# Patient Record
Sex: FEMALE | Race: WHITE | NOT HISPANIC OR LATINO | Employment: OTHER | URBAN - METROPOLITAN AREA
[De-identification: names, ages, dates, MRNs, and addresses within clinical notes are randomized per-mention and may not be internally consistent; named-entity substitution may affect disease eponyms.]

---

## 2018-01-25 ENCOUNTER — OFFICE VISIT (OUTPATIENT)
Dept: MEDICAL GROUP | Facility: PHYSICIAN GROUP | Age: 63
End: 2018-01-25
Payer: COMMERCIAL

## 2018-01-25 VITALS
OXYGEN SATURATION: 94 % | HEART RATE: 71 BPM | HEIGHT: 70 IN | WEIGHT: 220 LBS | BODY MASS INDEX: 31.5 KG/M2 | SYSTOLIC BLOOD PRESSURE: 138 MMHG | RESPIRATION RATE: 16 BRPM | TEMPERATURE: 98.4 F | DIASTOLIC BLOOD PRESSURE: 80 MMHG

## 2018-01-25 DIAGNOSIS — H40.9 GLAUCOMA, UNSPECIFIED GLAUCOMA TYPE, UNSPECIFIED LATERALITY: ICD-10-CM

## 2018-01-25 DIAGNOSIS — Z12.39 BREAST CANCER SCREENING: ICD-10-CM

## 2018-01-25 DIAGNOSIS — Z76.89 ENCOUNTER TO ESTABLISH CARE WITH NEW DOCTOR: ICD-10-CM

## 2018-01-25 DIAGNOSIS — Z23 NEED FOR VACCINATION: ICD-10-CM

## 2018-01-25 DIAGNOSIS — Z00.00 WELLNESS EXAMINATION: ICD-10-CM

## 2018-01-25 PROCEDURE — 99203 OFFICE O/P NEW LOW 30 MIN: CPT | Mod: 25 | Performed by: NURSE PRACTITIONER

## 2018-01-25 PROCEDURE — 90715 TDAP VACCINE 7 YRS/> IM: CPT | Performed by: NURSE PRACTITIONER

## 2018-01-25 PROCEDURE — 90472 IMMUNIZATION ADMIN EACH ADD: CPT | Performed by: NURSE PRACTITIONER

## 2018-01-25 PROCEDURE — 90471 IMMUNIZATION ADMIN: CPT | Performed by: NURSE PRACTITIONER

## 2018-01-25 PROCEDURE — 90686 IIV4 VACC NO PRSV 0.5 ML IM: CPT | Performed by: NURSE PRACTITIONER

## 2018-01-25 RX ORDER — LATANOPROST 50 UG/ML
1 SOLUTION/ DROPS OPHTHALMIC NIGHTLY
COMMUNITY

## 2018-01-25 ASSESSMENT — PATIENT HEALTH QUESTIONNAIRE - PHQ9: CLINICAL INTERPRETATION OF PHQ2 SCORE: 0

## 2018-01-25 NOTE — LETTER
[employer] [facility]  TAMIKA Jarquin  [address]  [place] [address]  Fax: [phone]   Authorization for Release/Disclosure of   Protected Health Information   Name: MCKENZIE CASTORENA : 1955 SSN: [id number]   Address:  [address]  [place] [address] Phone:    [phone] (home)    I authorize the entity listed below to release/disclose the PHI below to:   [facility]/TAMIKA Jarquin and TAMIKA Jarquin   Provider or Entity Name:                                             DR. OCONNOR   Address   City, [facility], [facility]   Phone:      Fax:     Reason for request: continuity of care   Information to be released:    [  ] LAST COLONOSCOPY,  including any PATH REPORT and follow-up  [  ] LAST FIT/COLOGUARD RESULT [  ] LAST DEXA  [  ] LAST MAMMOGRAM  [  ] LAST PAP  [  ] LAST LABS [  ] RETINA EXAM REPORT  [  ] IMMUNIZATION RECORDS  [  ] Release all info      [  ] Check here and initial the line next to each item to release ALL health information INCLUDING  _____ Care and treatment for drug and / or alcohol abuse  _____ HIV testing, infection status, or AIDS  _____ Genetic Testing    DATES OF SERVICE OR TIME PERIOD TO BE DISCLOSED: _____________  I understand and acknowledge that:  * This Authorization may be revoked at any time by you in writing, except if your health information has already been used or disclosed.  * Your health information that will be used or disclosed as a result of you signing this authorization could be re-disclosed by the recipient. If this occurs, your re-disclosed health information may no longer be protected by State or Federal laws.  * You may refuse to sign this Authorization. Your refusal will not affect your ability to obtain treatment.  * This Authorization becomes effective upon signing and will  on (date) __________.      If no date is indicated, this Authorization will  one (1) year from the signature date.    Name: Mckenzie  Coretta    Signature:   Date:     1/25/2018       PLEASE FAX REQUESTED RECORDS BACK TO: [phone]

## 2018-01-25 NOTE — ASSESSMENT & PLAN NOTE
Is here to establish with a new primary care provider.  Was previously seen in [place], [place].

## 2018-01-25 NOTE — PROGRESS NOTES
No chief complaint on file.      HISTORY OF PRESENT ILLNESS: Patient is a 62 y.o. female new patient who presents today to discuss the following issues:    Encounter to establish care with new doctor  Is here to establish with a new primary care provider.  Was previously seen in [place], [place].      BMI 31.0-31.9,adult  Patient is aware of BMI elevation.  Brief discussion of diet, exercise, and lifestyle modification.      Breast cancer screening  Due for screening mammogram.    Need for vaccination  Would like a flu shot and a Tdap shot today.      Glaucoma  Needs a referral to ophthalmology.  Has been stable on eyedrops for years.      Patient Active Problem List    Diagnosis Date Noted   • Encounter to establish care with new doctor 01/25/2018   • BMI 31.0-31.9,adult 01/25/2018   • Need for vaccination 01/25/2018   • Glaucoma 01/25/2018   • Breast cancer screening 01/25/2018       Allergies:Patient has no known allergies.    Current Outpatient Prescriptions   Medication Sig Dispense Refill   • latanoprost (XALATAN) 0.005 % Solution Place 1 Drop in both eyes every evening.       No current facility-administered medications for this visit.        Social History   Substance Use Topics   • Smoking status: Never Smoker   • Smokeless tobacco: Never Used   • Alcohol use No       No family status information on file.     Family History   Problem Relation Age of Onset   • Heart Attack Mother 76   • Kidney Disease Mother    • Lung Cancer Father 56       Review of Systems:   Constitutional: Negative for fever, chills, weight loss and malaise/fatigue.   HENT: Negative for ear pain, nosebleeds, congestion, sore throat and neck pain.    Eyes: Negative for blurred vision. Positive for stable glaucoma.  Respiratory: Negative for cough, sputum production, shortness of breath and wheezing.    Cardiovascular: Negative for chest pain, palpitations, orthopnea and leg swelling.   Gastrointestinal: Negative for heartburn, nausea,  "vomiting and abdominal pain.   Genitourinary: Negative for dysuria, urgency and frequency.   Musculoskeletal: Negative for myalgias, joint pain, and back pain.  Skin: Negative for rash and itching.   Neurological: Negative for dizziness, tingling, tremors, sensory change, focal weakness and headaches.   Endo/Heme/Allergies: Does not bruise/bleed easily.   Psychiatric/Behavioral: Negative for depression, suicidal ideas and memory loss.  The patient is not nervous/anxious and does not have insomnia.    All other systems reviewed and are negative except as in HPI.    Exam:  Blood pressure 138/80, pulse 71, temperature 36.9 °C (98.4 °F), resp. rate 16, height 1.778 m (5' 10\"), weight 99.8 kg (220 lb), SpO2 94 %.  General:  Well nourished, well developed female in NAD  Head: Grossly normal.  Neck: Supple without JVD or bruit. Thyroid is not enlarged.  Pulmonary: Clear to ausculation. Normal effort. No rales, ronchi, or wheezing.  Cardiovascular: Regular rate and rhythm without murmur.   Abdomen:  Bowel sounds + x 4. Soft, non-tender, nondistended.  Extremities: No clubbing, cyanosis, or edema.  Skin: Intact with no obvious rashes or lesions.  Neuro: Grossly intact.  Psych: Alert and oriented x 3.  Mood and affect appropriate.    Medical decision-making and discussion: Mckenzie is here to establish with a new primary care provider.  We reviewed her past medical history and discussed her current medications.  Lab work and mammogram were ordered, a referral was sent to opthalmology, and she was given flu and Tdap shots. She will sign a records release for her previous provider, she will sign up with [facility], and she will plan to follow-up here as needed.     I have placed the below orders and discussed them with an approved delegating provider. The MA is performing the below orders under the direction of Dr. Stephen, who have provided verbal consent for supervision.          Assessment/Plan:  1. Encounter to establish care with " new doctor     2. BMI 31.0-31.9,adult  Patient identified as having weight management issue.  Appropriate orders and counseling given.   3. Wellness examination  CBC WITH DIFFERENTIAL    COMP METABOLIC PANEL    LIPID PROFILE    TSH    VITAMIN D,25 HYDROXY   4. Breast cancer screening  MA-SCREEN MAMMO W/CAD-BILAT   5. Need for vaccination  Flu Quad Inj >3 Year Pre-Filled PF    Tdap =>8yo IM   6. Glaucoma, unspecified glaucoma type, unspecified laterality  REFERRAL TO OPHTHALMOLOGY       Return if symptoms worsen or fail to improve.    Please note that this dictation was created using voice recognition software. I have made every reasonable attempt to correct obvious errors, but I expect that there are errors of grammar and possibly content that I did not discover before finalizing the note.

## 2018-01-30 ENCOUNTER — HOSPITAL ENCOUNTER (OUTPATIENT)
Dept: RADIOLOGY | Facility: MEDICAL CENTER | Age: 63
End: 2018-01-30
Attending: NURSE PRACTITIONER
Payer: COMMERCIAL

## 2018-01-30 DIAGNOSIS — Z12.39 BREAST CANCER SCREENING: ICD-10-CM

## 2018-01-30 PROCEDURE — 77067 SCR MAMMO BI INCL CAD: CPT

## 2018-02-01 ENCOUNTER — HOSPITAL ENCOUNTER (OUTPATIENT)
Dept: LAB | Facility: MEDICAL CENTER | Age: 63
End: 2018-02-01
Attending: NURSE PRACTITIONER
Payer: COMMERCIAL

## 2018-02-01 DIAGNOSIS — Z00.00 WELLNESS EXAMINATION: ICD-10-CM

## 2018-02-01 LAB
25(OH)D3 SERPL-MCNC: 21 NG/ML (ref 30–100)
ALBUMIN SERPL BCP-MCNC: 4.3 G/DL (ref 3.2–4.9)
ALBUMIN/GLOB SERPL: 1.3 G/DL
ALP SERPL-CCNC: 86 U/L (ref 30–99)
ALT SERPL-CCNC: 17 U/L (ref 2–50)
ANION GAP SERPL CALC-SCNC: 8 MMOL/L (ref 0–11.9)
AST SERPL-CCNC: 18 U/L (ref 12–45)
BASOPHILS # BLD AUTO: 1.3 % (ref 0–1.8)
BASOPHILS # BLD: 0.07 K/UL (ref 0–0.12)
BILIRUB SERPL-MCNC: 0.7 MG/DL (ref 0.1–1.5)
BUN SERPL-MCNC: 19 MG/DL (ref 8–22)
CALCIUM SERPL-MCNC: 9.1 MG/DL (ref 8.5–10.5)
CHLORIDE SERPL-SCNC: 104 MMOL/L (ref 96–112)
CHOLEST SERPL-MCNC: 199 MG/DL (ref 100–199)
CO2 SERPL-SCNC: 28 MMOL/L (ref 20–33)
CREAT SERPL-MCNC: 0.74 MG/DL (ref 0.5–1.4)
EOSINOPHIL # BLD AUTO: 0.11 K/UL (ref 0–0.51)
EOSINOPHIL NFR BLD: 2.1 % (ref 0–6.9)
ERYTHROCYTE [DISTWIDTH] IN BLOOD BY AUTOMATED COUNT: 41.7 FL (ref 35.9–50)
GLOBULIN SER CALC-MCNC: 3.3 G/DL (ref 1.9–3.5)
GLUCOSE SERPL-MCNC: 90 MG/DL (ref 65–99)
HCT VFR BLD AUTO: 49 % (ref 37–47)
HDLC SERPL-MCNC: 63 MG/DL
HGB BLD-MCNC: 16.6 G/DL (ref 12–16)
IMM GRANULOCYTES # BLD AUTO: 0.03 K/UL (ref 0–0.11)
IMM GRANULOCYTES NFR BLD AUTO: 0.6 % (ref 0–0.9)
LDLC SERPL CALC-MCNC: 105 MG/DL
LYMPHOCYTES # BLD AUTO: 1.57 K/UL (ref 1–4.8)
LYMPHOCYTES NFR BLD: 29.7 % (ref 22–41)
MCH RBC QN AUTO: 30.9 PG (ref 27–33)
MCHC RBC AUTO-ENTMCNC: 33.9 G/DL (ref 33.6–35)
MCV RBC AUTO: 91.2 FL (ref 81.4–97.8)
MONOCYTES # BLD AUTO: 0.34 K/UL (ref 0–0.85)
MONOCYTES NFR BLD AUTO: 6.4 % (ref 0–13.4)
NEUTROPHILS # BLD AUTO: 3.16 K/UL (ref 2–7.15)
NEUTROPHILS NFR BLD: 59.9 % (ref 44–72)
NRBC # BLD AUTO: 0 K/UL
NRBC BLD-RTO: 0 /100 WBC
PLATELET # BLD AUTO: 282 K/UL (ref 164–446)
PMV BLD AUTO: 9.7 FL (ref 9–12.9)
POTASSIUM SERPL-SCNC: 4.3 MMOL/L (ref 3.6–5.5)
PROT SERPL-MCNC: 7.6 G/DL (ref 6–8.2)
RBC # BLD AUTO: 5.37 M/UL (ref 4.2–5.4)
SODIUM SERPL-SCNC: 140 MMOL/L (ref 135–145)
TRIGL SERPL-MCNC: 153 MG/DL (ref 0–149)
TSH SERPL DL<=0.005 MIU/L-ACNC: 1.58 UIU/ML (ref 0.38–5.33)
WBC # BLD AUTO: 5.3 K/UL (ref 4.8–10.8)

## 2018-02-01 PROCEDURE — 82306 VITAMIN D 25 HYDROXY: CPT

## 2018-02-01 PROCEDURE — 84443 ASSAY THYROID STIM HORMONE: CPT

## 2018-02-01 PROCEDURE — 85025 COMPLETE CBC W/AUTO DIFF WBC: CPT

## 2018-02-01 PROCEDURE — 36415 COLL VENOUS BLD VENIPUNCTURE: CPT

## 2018-02-01 PROCEDURE — 80053 COMPREHEN METABOLIC PANEL: CPT

## 2018-02-01 PROCEDURE — 80061 LIPID PANEL: CPT

## 2018-02-05 ENCOUNTER — HOSPITAL ENCOUNTER (OUTPATIENT)
Dept: RADIOLOGY | Facility: MEDICAL CENTER | Age: 63
End: 2018-02-05

## 2018-02-06 RX ORDER — ERGOCALCIFEROL 1.25 MG/1
CAPSULE ORAL
Qty: 16 CAP | Refills: 0 | Status: SHIPPED | OUTPATIENT
Start: 2018-02-06 | End: 2018-12-12

## 2018-12-04 ENCOUNTER — TELEPHONE (OUTPATIENT)
Dept: MEDICAL GROUP | Facility: PHYSICIAN GROUP | Age: 63
End: 2018-12-04

## 2018-12-04 NOTE — TELEPHONE ENCOUNTER
Future Appointments       Provider Department Center    12/12/2018 3:25 PM MUNIR Jarquin.; [facility]        ANNUAL WELLNESS VISIT PRE-VISIT PLANNING WITHOUT OUTREACH    1.  Reviewed note from last office visit with PCP: YES    2.  If any orders were placed at last visit, do we have Results/Consult Notes?        •  Labs - Labs ordered, completed on 02/01/2018 and results are in chart.       •  Imaging - Imaging ordered, completed and results are in chart.       •  Referrals - Referral ordered, patient was seen and consult notes are in chart. Care Teams updated  YES.    3.  Immunizations were updated in [employer] using WebIZ?: Yes       •  WebIZ Recommendations: FLU, TD and SHINGRIX (Shingles)        •  Is patient due for Tdap? NO       •  Is patient due for Shingles? YES. Patient was not notified of copay/out of pocket cost. NOT AVAILABLE IN OFFICE    4.  Patient is due for the following Health Maintenance Topics:   Health Maintenance Due   Topic Date Due   • PAP SMEAR  12/26/1974   • COLONOSCOPY  12/26/2003   • IMM ZOSTER VACCINES (2 of 3) 10/27/2016   • IMM INFLUENZA (1) 09/01/2018       5.  Reviewed/Updated the following with patient:       •   Preferred Pharmacy? YES       •   Preferred Lab? YES       •   Preferred Communication? YES       •   Allergies? YES       •   Medications? YES. Was Abstract Encounter opened and chart updated? YES       •   Social History? YES. Was Abstract Encounter opened and chart updated? YES       •   Family History (document living status of immediate family members and if + hx of  cancer, diabetes, hypertension, hyperlipidemia, heart attack, stroke) YES. Was Abstract Encounter opened and chart updated? YES    6.  Care Team Updated:       •   DME Company (gait device, O2, CPAP, etc.): NO       •   Other Specialists (eye doctor, derm, GYN, cardiology, endo, etc): YES    7.  Patient has the following Care Path diagnoses on  Problem List:  NONE    8.  Patient was advised: “This is a free wellness visit. The provider will screen for medical conditions to help you stay healthy. If you have other concerns to address you may be asked to discuss these at a separate visit or there may be an additional fee.”     9.  Patient was informed to arrive 15 min prior to their scheduled appointment and bring in their medication bottles.

## 2018-12-12 ENCOUNTER — OFFICE VISIT (OUTPATIENT)
Dept: MEDICAL GROUP | Facility: PHYSICIAN GROUP | Age: 63
End: 2018-12-12
Payer: MEDICARE

## 2018-12-12 VITALS
HEIGHT: 70 IN | OXYGEN SATURATION: 95 % | DIASTOLIC BLOOD PRESSURE: 90 MMHG | RESPIRATION RATE: 14 BRPM | WEIGHT: 221.6 LBS | HEART RATE: 104 BPM | TEMPERATURE: 99 F | SYSTOLIC BLOOD PRESSURE: 124 MMHG | BODY MASS INDEX: 31.73 KG/M2

## 2018-12-12 DIAGNOSIS — R79.89 LOW VITAMIN D LEVEL: ICD-10-CM

## 2018-12-12 DIAGNOSIS — R53.83 FATIGUE, UNSPECIFIED TYPE: ICD-10-CM

## 2018-12-12 DIAGNOSIS — Z12.39 BREAST CANCER SCREENING: ICD-10-CM

## 2018-12-12 DIAGNOSIS — E78.00 ELEVATED CHOLESTEROL: ICD-10-CM

## 2018-12-12 DIAGNOSIS — Z23 NEED FOR VACCINATION: ICD-10-CM

## 2018-12-12 PROBLEM — Z00.00 MEDICARE ANNUAL WELLNESS VISIT, INITIAL: Status: ACTIVE | Noted: 2018-12-12

## 2018-12-12 PROBLEM — Z76.89 ENCOUNTER TO ESTABLISH CARE WITH NEW DOCTOR: Status: RESOLVED | Noted: 2018-01-25 | Resolved: 2018-12-12

## 2018-12-12 PROBLEM — Z00.00 MEDICARE ANNUAL WELLNESS VISIT, INITIAL: Status: RESOLVED | Noted: 2018-12-12 | Resolved: 2018-12-12

## 2018-12-12 PROCEDURE — 90686 IIV4 VACC NO PRSV 0.5 ML IM: CPT | Performed by: NURSE PRACTITIONER

## 2018-12-12 PROCEDURE — G0402 INITIAL PREVENTIVE EXAM: HCPCS | Mod: 25 | Performed by: NURSE PRACTITIONER

## 2018-12-12 PROCEDURE — G0008 ADMIN INFLUENZA VIRUS VAC: HCPCS | Performed by: NURSE PRACTITIONER

## 2018-12-12 ASSESSMENT — ACTIVITIES OF DAILY LIVING (ADL): BATHING_REQUIRES_ASSISTANCE: 0

## 2018-12-12 ASSESSMENT — PATIENT HEALTH QUESTIONNAIRE - PHQ9: CLINICAL INTERPRETATION OF PHQ2 SCORE: 0

## 2018-12-12 ASSESSMENT — ENCOUNTER SYMPTOMS: GENERAL WELL-BEING: GOOD

## 2018-12-12 NOTE — PROGRESS NOTES
Chief Complaint   Patient presents with   • Annual Wellness Visit         HPI:  Mckenzie is a 62 y.o. here for Medicare Annual Wellness Visit      Patient Active Problem List    Diagnosis Date Noted   • BMI 31.0-31.9,adult 01/25/2018   • Need for vaccination 01/25/2018   • Glaucoma 01/25/2018   • Breast cancer screening 01/25/2018       Current Outpatient Prescriptions   Medication Sig Dispense Refill   • latanoprost (XALATAN) 0.005 % Solution Place 1 Drop in both eyes every evening.       No current facility-administered medications for this visit.         Patient is taking medications as noted in medication list.  Current supplements as per medication list.     Allergies: Patient has no known allergies.    Current social contact/activities: Visits with friends and family,     Is patient current with immunizations? No, due for FLU and SHINGRIX (Shingles). Patient is interested in receiving FLU today.    She  reports that she has never smoked. She has never used smokeless tobacco. She reports that she does not drink alcohol or use drugs.  Counseling given: Yes        DPA/Advanced directive: Patient has Advanced Directive on file.     ROS:    Gait: Uses no assistive device   Ostomy: No   Other tubes: No   Amputations: No   Chronic oxygen use No   Last eye exam 07/2018  Wears hearing aids: No   : Reports urinary leakage during the last 6 months that has not interfered at all with their daily activities or sleep.      Screening:        Depression Screening    Little interest or pleasure in doing things?  0 - not at all  Feeling down, depressed, or hopeless? 0 - not at all  Patient Health Questionnaire Score: 0    If depressive symptoms identified deferred to follow up visit unless specifically addressed in assessment and plan.    Interpretation of PHQ-9 Total Score   Score Severity   1-4 No Depression   5-9 Mild Depression   10-14 Moderate Depression   15-19 Moderately Severe Depression   20-27 Severe  Depression    Screening for Cognitive Impairment    Three Minute Recall (leader, season, table)  3/3 Leader, season, table  Ishan clock face with all 12 numbers and set the hands to show 10 past 11.  Yes 11:10 5/5  If cognitive concerns identified, deferred for follow up unless specifically addressed in assessment and plan.    Fall Risk Assessment    Has the patient had two or more falls in the last year or any fall with injury in the last year?  No  If fall risk identified, deferred for follow up unless specifically addressed in assessment and plan.    Safety Assessment    Throw rugs on floor.  Yes  Handrails on all stairs.  Yes  Good lighting in all hallways.  Yes  Difficulty hearing.  No  Patient counseled about all safety risks that were identified.    Functional Assessment ADLs    Are there any barriers preventing you from cooking for yourself or meeting nutritional needs?  No.    Are there any barriers preventing you from driving safely or obtaining transportation?  No.    Are there any barriers preventing you from using a telephone or calling for help?  No.    Are there any barriers preventing you from shopping?  No.    Are there any barriers preventing you from taking care of your own finances?  No.    Are there any barriers preventing you from managing your medications?  No.    Are there any barriers preventing you from showering, bathing or dressing yourself?  No.    Are you currently engaging in any exercise or physical activity?  Yes.  Hiking,  What is your perception of your health?  Good.    Health Maintenance Summary                PAP SMEAR Overdue 12/26/1974     COLONOSCOPY Overdue 12/26/2003     IMM ZOSTER VACCINES Overdue 10/27/2016      Done 9/1/2016 Ext Imm: Zoster (Zostavax) Vaccine    MAMMOGRAM Next Due 1/30/2019      Done 1/30/2018 MA-SCREEN MAMMO W/CAD-BILAT    IMM DTaP/Tdap/Td Vaccine Next Due 1/25/2028      Done 1/25/2018 Imm Admin: Tdap Vaccine          Patient Care Team:  Osmani MENARD  "TAMIKA Obando as PCP - General (Family Medicine)  Gentry Farley M.D. as Consulting Physician (Ophthalmology)    Social History   Substance Use Topics   • Smoking status: Never Smoker   • Smokeless tobacco: Never Used   • Alcohol use No     Family History   Problem Relation Age of Onset   • Heart Attack Mother 76   • Kidney Disease Mother    • Lung Cancer Father 56   • Alcohol/Drug Father         Alcoholic   • Other Sister         Low BP   • Other Brother         Back and joint issues   • Lung Disease Maternal Grandmother    • No Known Problems Maternal Grandfather    • No Known Problems Paternal Grandmother    • Other Paternal Grandfather         Suicide   • Alcohol/Drug Paternal Grandfather    • Hypertension Sister    • No Known Problems Son    • No Known Problems Son      She  has no past medical history on file.   Past Surgical History:   Procedure Laterality Date   • WRIST ORIF Left 03/2017   • FOOT SURGERY Right 80's   • TONSILLECTOMY  60's           Exam:     Blood pressure 124/90, pulse (!) 104, temperature 37.2 °C (99 °F), temperature source Temporal, resp. rate 14, height 1.778 m (5' 10\"), weight 100.5 kg (221 lb 9.6 oz), SpO2 95 %. Body mass index is 31.8 kg/m².    Hearing excellent.    Dentition good  Alert, oriented in no acute distress.  Eye contact is good, speech goal directed, affect calm      Assessment and Plan. The following treatment and monitoring plan is recommended:    1. Need for vaccination  Influenza Vaccine Quad Injection >3Y (PF)    Initial Annual Wellness Visit - Includes PPPS ()   2. Breast cancer screening  MA-SCREENING MAMMO BILAT W/TOMOSYNTHESIS W/CAD    Initial Annual Wellness Visit - Includes PPPS ()   3. Low vitamin D level  VITAMIN D,25 HYDROXY    Initial Annual Wellness Visit - Includes PPPS ()   4. Fatigue, unspecified type  CBC WITH DIFFERENTIAL    TSH   5. Elevated cholesterol  CBC WITH DIFFERENTIAL    COMP METABOLIC PANEL    Lipid Profile     Need for " vaccination  Would like a flu shot today.      Breast cancer screening  Due for mammogram after February.        Services suggested: No services needed at this time  Health Care Screening recommendations as per orders if indicated.  Referrals offered: PT/OT/Nutrition counseling/Behavioral Health/Smoking cessation as per orders if indicated.    Discussion today about general wellness and lifestyle habits:    · Prevent falls and reduce trip hazards; Cautioned about securing or removing rugs.  · Have a working fire alarm and carbon monoxide detector;   · Engage in regular physical activity and social activities       Follow-up: Return if symptoms worsen or fail to improve.

## 2019-02-01 ENCOUNTER — HOSPITAL ENCOUNTER (OUTPATIENT)
Dept: RADIOLOGY | Facility: MEDICAL CENTER | Age: 64
End: 2019-02-01
Attending: NURSE PRACTITIONER
Payer: MEDICARE

## 2019-02-01 DIAGNOSIS — Z12.39 BREAST CANCER SCREENING: ICD-10-CM

## 2019-02-01 PROCEDURE — 77063 BREAST TOMOSYNTHESIS BI: CPT

## 2019-02-20 ENCOUNTER — HOSPITAL ENCOUNTER (OUTPATIENT)
Dept: LAB | Facility: MEDICAL CENTER | Age: 64
End: 2019-02-20
Attending: NURSE PRACTITIONER
Payer: MEDICARE

## 2019-02-20 DIAGNOSIS — R53.83 FATIGUE, UNSPECIFIED TYPE: ICD-10-CM

## 2019-02-20 DIAGNOSIS — R79.89 LOW VITAMIN D LEVEL: ICD-10-CM

## 2019-02-20 DIAGNOSIS — E78.00 ELEVATED CHOLESTEROL: ICD-10-CM

## 2019-02-20 LAB
25(OH)D3 SERPL-MCNC: 32 NG/ML (ref 30–100)
ALBUMIN SERPL BCP-MCNC: 4.7 G/DL (ref 3.2–4.9)
ALBUMIN/GLOB SERPL: 1.4 G/DL
ALP SERPL-CCNC: 75 U/L (ref 30–99)
ALT SERPL-CCNC: 22 U/L (ref 2–50)
ANION GAP SERPL CALC-SCNC: 9 MMOL/L (ref 0–11.9)
AST SERPL-CCNC: 15 U/L (ref 12–45)
BASOPHILS # BLD AUTO: 0.8 % (ref 0–1.8)
BASOPHILS # BLD: 0.05 K/UL (ref 0–0.12)
BILIRUB SERPL-MCNC: 1 MG/DL (ref 0.1–1.5)
BUN SERPL-MCNC: 17 MG/DL (ref 8–22)
CALCIUM SERPL-MCNC: 9.5 MG/DL (ref 8.5–10.5)
CHLORIDE SERPL-SCNC: 106 MMOL/L (ref 96–112)
CHOLEST SERPL-MCNC: 194 MG/DL (ref 100–199)
CO2 SERPL-SCNC: 25 MMOL/L (ref 20–33)
CREAT SERPL-MCNC: 0.81 MG/DL (ref 0.5–1.4)
EOSINOPHIL # BLD AUTO: 0.13 K/UL (ref 0–0.51)
EOSINOPHIL NFR BLD: 2.2 % (ref 0–6.9)
ERYTHROCYTE [DISTWIDTH] IN BLOOD BY AUTOMATED COUNT: 45.1 FL (ref 35.9–50)
FASTING STATUS PATIENT QL REPORTED: NORMAL
GLOBULIN SER CALC-MCNC: 3.4 G/DL (ref 1.9–3.5)
GLUCOSE SERPL-MCNC: 118 MG/DL (ref 65–99)
HCT VFR BLD AUTO: 51.8 % (ref 37–47)
HDLC SERPL-MCNC: 54 MG/DL
HGB BLD-MCNC: 17.2 G/DL (ref 12–16)
IMM GRANULOCYTES # BLD AUTO: 0.02 K/UL (ref 0–0.11)
IMM GRANULOCYTES NFR BLD AUTO: 0.3 % (ref 0–0.9)
LDLC SERPL CALC-MCNC: 93 MG/DL
LYMPHOCYTES # BLD AUTO: 1.63 K/UL (ref 1–4.8)
LYMPHOCYTES NFR BLD: 27.3 % (ref 22–41)
MCH RBC QN AUTO: 31.3 PG (ref 27–33)
MCHC RBC AUTO-ENTMCNC: 33.2 G/DL (ref 33.6–35)
MCV RBC AUTO: 94.4 FL (ref 81.4–97.8)
MONOCYTES # BLD AUTO: 0.39 K/UL (ref 0–0.85)
MONOCYTES NFR BLD AUTO: 6.5 % (ref 0–13.4)
NEUTROPHILS # BLD AUTO: 3.75 K/UL (ref 2–7.15)
NEUTROPHILS NFR BLD: 62.9 % (ref 44–72)
NRBC # BLD AUTO: 0 K/UL
NRBC BLD-RTO: 0 /100 WBC
PLATELET # BLD AUTO: 263 K/UL (ref 164–446)
PMV BLD AUTO: 10.1 FL (ref 9–12.9)
POTASSIUM SERPL-SCNC: 4.3 MMOL/L (ref 3.6–5.5)
PROT SERPL-MCNC: 8.1 G/DL (ref 6–8.2)
RBC # BLD AUTO: 5.49 M/UL (ref 4.2–5.4)
SODIUM SERPL-SCNC: 140 MMOL/L (ref 135–145)
TRIGL SERPL-MCNC: 233 MG/DL (ref 0–149)
TSH SERPL DL<=0.005 MIU/L-ACNC: 1.34 UIU/ML (ref 0.38–5.33)
WBC # BLD AUTO: 6 K/UL (ref 4.8–10.8)

## 2019-02-20 PROCEDURE — 80053 COMPREHEN METABOLIC PANEL: CPT

## 2019-02-20 PROCEDURE — 84443 ASSAY THYROID STIM HORMONE: CPT

## 2019-02-20 PROCEDURE — 82306 VITAMIN D 25 HYDROXY: CPT | Mod: GA

## 2019-02-20 PROCEDURE — 80061 LIPID PANEL: CPT

## 2019-02-20 PROCEDURE — 36415 COLL VENOUS BLD VENIPUNCTURE: CPT

## 2019-02-20 PROCEDURE — 85025 COMPLETE CBC W/AUTO DIFF WBC: CPT

## 2019-04-16 ENCOUNTER — OFFICE VISIT (OUTPATIENT)
Dept: MEDICAL GROUP | Facility: PHYSICIAN GROUP | Age: 64
End: 2019-04-16
Payer: MEDICARE

## 2019-04-16 VITALS
TEMPERATURE: 99.1 F | DIASTOLIC BLOOD PRESSURE: 86 MMHG | RESPIRATION RATE: 16 BRPM | HEART RATE: 84 BPM | WEIGHT: 222 LBS | SYSTOLIC BLOOD PRESSURE: 128 MMHG | OXYGEN SATURATION: 94 % | BODY MASS INDEX: 31.78 KG/M2 | HEIGHT: 70 IN

## 2019-04-16 DIAGNOSIS — R73.09 ELEVATED GLUCOSE: ICD-10-CM

## 2019-04-16 DIAGNOSIS — Z12.12 SCREENING FOR COLORECTAL CANCER: ICD-10-CM

## 2019-04-16 DIAGNOSIS — Z23 NEED FOR VACCINATION: ICD-10-CM

## 2019-04-16 DIAGNOSIS — Z12.11 SCREENING FOR COLORECTAL CANCER: ICD-10-CM

## 2019-04-16 PROBLEM — Z12.39 BREAST CANCER SCREENING: Status: RESOLVED | Noted: 2018-01-25 | Resolved: 2019-04-16

## 2019-04-16 PROCEDURE — G0009 ADMIN PNEUMOCOCCAL VACCINE: HCPCS | Performed by: NURSE PRACTITIONER

## 2019-04-16 PROCEDURE — 99214 OFFICE O/P EST MOD 30 MIN: CPT | Mod: 25 | Performed by: NURSE PRACTITIONER

## 2019-04-16 PROCEDURE — 90670 PCV13 VACCINE IM: CPT | Performed by: NURSE PRACTITIONER

## 2019-04-16 ASSESSMENT — PATIENT HEALTH QUESTIONNAIRE - PHQ9: CLINICAL INTERPRETATION OF PHQ2 SCORE: 0

## 2019-04-16 NOTE — PROGRESS NOTES
Chief Complaint   Patient presents with   • Results     Labs FV        HISTORY OF PRESENT ILLNESS: Patient is a 63 y.o. female established patient who presents today to discuss the following issues:    Screening for colorectal cancer  Due for FIT.    Need for vaccination  Due for Prevnar 13.    Elevated glucose  Reviewed labs.  Fasting glucose was elevated at 118.  She will decrease her sugar and carb intake and check an A1c in 3 months.      Patient Active Problem List    Diagnosis Date Noted   • Screening for colorectal cancer 2019   • Elevated glucose 2019   • BMI 31.0-31.9,adult 2018   • Need for vaccination 2018   • Glaucoma 2018       Allergies:Patient has no known allergies.    Current Outpatient Prescriptions   Medication Sig Dispense Refill   • latanoprost (XALATAN) 0.005 % Solution Place 1 Drop in both eyes every evening.       No current facility-administered medications for this visit.        Social History   Substance Use Topics   • Smoking status: Never Smoker   • Smokeless tobacco: Never Used   • Alcohol use No       Family Status   Relation Status   • Mo    • Fa    • Sis Alive   • Bro Alive   • MGMo    • MGFa    • PGMo    • PGFa    • Sis Alive   • Son Alive   • Son Alive     Family History   Problem Relation Age of Onset   • Heart Attack Mother 76   • Kidney Disease Mother    • Lung Cancer Father 56   • Alcohol/Drug Father         Alcoholic   • Other Sister         Low BP   • Other Brother         Back and joint issues   • Lung Disease Maternal Grandmother    • No Known Problems Maternal Grandfather    • No Known Problems Paternal Grandmother    • Other Paternal Grandfather         Suicide   • Alcohol/Drug Paternal Grandfather    • Hypertension Sister    • No Known Problems Son    • No Known Problems Son        Review of Systems:   Constitutional: Negative for fever, chills, weight loss and malaise/fatigue.   HENT: Negative  "for ear pain, nosebleeds, congestion, sore throat and neck pain.    Eyes: Negative for blurred vision.   Respiratory: Negative for cough, sputum production, shortness of breath and wheezing.    Cardiovascular: Negative for chest pain, palpitations, orthopnea and leg swelling.   Gastrointestinal: Negative for heartburn, nausea, vomiting and abdominal pain.   Genitourinary: Negative for dysuria, urgency and frequency.   Musculoskeletal: Negative for myalgias, joint pain, and back pain.  Skin: Negative for rash and itching.   Neurological: Negative for dizziness, tingling, tremors, sensory change, focal weakness and headaches.   Endo/Heme/Allergies: Does not bruise/bleed easily.   Psychiatric/Behavioral: Negative for depression, suicidal ideas and memory loss.  The patient is not nervous/anxious and does not have insomnia.    All other systems reviewed and are negative except as in HPI.    Exam:  /86   Pulse 84   Temp 37.3 °C (99.1 °F)   Resp 16   Ht 1.778 m (5' 10\")   Wt 100.7 kg (222 lb)   SpO2 94%   General:  Well nourished, well developed female in NAD  Head: Grossly normal.  Neck: Supple without JVD or bruit. Thyroid is not enlarged.  Pulmonary: Clear to ausculation. Normal effort. No rales, ronchi, or wheezing.  Cardiovascular: Regular rate and rhythm without murmur.   Abdomen:  Soft, nontender, nondistended.  Extremities: No clubbing, cyanosis, or edema.  Skin: Intact with no obvious rashes or lesions.  Neuro: Grossly intact.  Psych: Alert and oriented x 3.  Mood and affect appropriate.    Medical decision-making and discussion: Mckenzie is here today for follow-up.  Her lab results were reviewed, an A1c was ordered to be done in 3 months, a FIT was ordered, and she was given Prevnar 13.  She will follow-up here as needed.    I have placed the below orders and discussed them with an approved delegating provider. The MA is performing the below orders under the direction of Dr. Stephen, who have provided " verbal consent for supervision.            Assessment/Plan:  1. Screening for colorectal cancer  OCCULT BLOOD FECES IMMUNOASSAY (FIT)   2. Need for vaccination  Pneumococcal Conjugate Vaccine 13-Valent <4yo IM   3. Elevated glucose  HEMOGLOBIN A1C       Return if symptoms worsen or fail to improve.    Please note that this dictation was created using voice recognition software. I have made every reasonable attempt to correct obvious errors, but I expect that there are errors of grammar and possibly content that I did not discover before finalizing the note.

## 2019-04-16 NOTE — ASSESSMENT & PLAN NOTE
Reviewed labs.  Fasting glucose was elevated at 118.  She will decrease her sugar and carb intake and check an A1c in 3 months.

## 2019-07-01 ENCOUNTER — HOSPITAL ENCOUNTER (OUTPATIENT)
Facility: MEDICAL CENTER | Age: 64
End: 2019-07-01
Attending: NURSE PRACTITIONER
Payer: MEDICARE

## 2019-07-01 PROCEDURE — 82274 ASSAY TEST FOR BLOOD FECAL: CPT

## 2019-07-02 DIAGNOSIS — Z12.12 SCREENING FOR COLORECTAL CANCER: ICD-10-CM

## 2019-07-02 DIAGNOSIS — Z12.11 SCREENING FOR COLORECTAL CANCER: ICD-10-CM

## 2019-07-02 LAB — HEMOCCULT STL QL IA: NEGATIVE

## 2019-07-17 ENCOUNTER — HOSPITAL ENCOUNTER (OUTPATIENT)
Dept: LAB | Facility: MEDICAL CENTER | Age: 64
End: 2019-07-17
Attending: NURSE PRACTITIONER
Payer: MEDICARE

## 2019-07-17 DIAGNOSIS — R73.09 ELEVATED GLUCOSE: ICD-10-CM

## 2019-07-17 LAB
EST. AVERAGE GLUCOSE BLD GHB EST-MCNC: 123 MG/DL
HBA1C MFR BLD: 5.9 % (ref 0–5.6)

## 2019-07-17 PROCEDURE — 36415 COLL VENOUS BLD VENIPUNCTURE: CPT | Mod: GA

## 2019-07-17 PROCEDURE — 83036 HEMOGLOBIN GLYCOSYLATED A1C: CPT | Mod: GA

## 2019-10-23 ENCOUNTER — NON-PROVIDER VISIT (OUTPATIENT)
Dept: MEDICAL GROUP | Facility: PHYSICIAN GROUP | Age: 64
End: 2019-10-23
Payer: MEDICARE

## 2019-10-23 DIAGNOSIS — Z23 NEED FOR VACCINATION: ICD-10-CM

## 2019-10-23 PROCEDURE — 90662 IIV NO PRSV INCREASED AG IM: CPT | Performed by: NURSE PRACTITIONER

## 2019-10-23 PROCEDURE — G0008 ADMIN INFLUENZA VIRUS VAC: HCPCS | Performed by: NURSE PRACTITIONER

## 2019-10-23 NOTE — PROGRESS NOTES
"Mckenzie Hitchcock is a 63 y.o. female here for a non-provider visit for:   FLU    Reason for immunization: Annual Flu Vaccine  Immunization records indicate need for vaccine: Yes, confirmed with Epic and confirmed with [place] Web[employer]  Minimum interval has been met for this vaccine: Yes  ABN completed: Not Indicated    Order and dose verified by: SONG  VIS Dated  08/15/2019 was given to patient: Yes  All IAC Questionnaire questions were answered \"No.\"    Patient tolerated injection and no adverse effects were observed or reported: Yes    Pt scheduled for next dose in series: Not Indicated    "

## 2020-02-21 ENCOUNTER — TELEPHONE (OUTPATIENT)
Dept: MEDICAL GROUP | Facility: PHYSICIAN GROUP | Age: 65
End: 2020-02-21

## 2020-02-21 NOTE — TELEPHONE ENCOUNTER
Future Appointments       Provider Department Center    3/12/2020 10:45 AM MUNIR Jarquin.; [facility]          ANNUAL WELLNESS VISIT PRE-VISIT PLANNING WITHOUT OUTREACH    1.  Reviewed note from last office visit with PCP: YES    2.  If any orders were placed at last visit, do we have Results/Consult Notes?        •  Labs - Labs ordered, completed on 07/17/2019 and results are in chart.       •  Imaging - Imaging was not ordered at last office visit.       •  Referrals - No referrals were ordered at last office visit.    3.  Immunizations were updated in [employer] using WebIZ?: Yes       •  WebIZ Recommendations: TD, SHINGRIX (Shingles) and CPOX        •  Is patient due for Tdap? NO       •  Is patient due for Shingrix? YES. Patient was not notified of copay/out of pocket cost.     4.  Patient is due for the following Health Maintenance Topics:   Health Maintenance Due   Topic Date Due   • HEPATITIS C SCREENING  1955   • COLONOSCOPY  12/26/2003   • IMM ZOSTER VACCINES (2 of 3) 10/27/2016   • BONE DENSITY  12/26/2018   • MAMMOGRAM  02/01/2020     5.  Reviewed/Updated the following with patient:       •   Preferred Pharmacy? YES       •   Preferred Lab? YES       •   Preferred Communication? YES       •   Allergies? YES       •   Medications? YES. Was Abstract Encounter opened and chart updated? YES       •   Social History? YES. Was Abstract Encounter opened and chart updated? YES       •   Family History (document living status of immediate family members and if + hx of  cancer, diabetes, hypertension, hyperlipidemia, heart attack, stroke) YES. Was Abstract Encounter opened and chart updated? YES    6.  Care Team Updated:       •   DME Company (gait device, O2, CPAP, etc.): NO       •   Other Specialists (eye doctor, derm, GYN, cardiology, endo, etc): YES    7. Orders for overdue Health Maintenance topics pended in Pre-Charting? NO    8.  Patient  has the following Care Path diagnoses on Problem List:  NONE    9.  Patient was advised: “This is a free wellness visit. The provider will screen for medical conditions to help you stay healthy. If you have other concerns to address you may be asked to discuss these at a separate visit or there may be an additional fee.”     10.  Patient was informed to arrive 15 min prior to their scheduled appointment and bring in their medication bottles.  02/21/2020- LV, 3/2/2020- PATIENT HAS BEEN RESCHEDULED TO 3/12/2020, 3/5/2020- [facility]

## 2020-02-25 ENCOUNTER — TELEPHONE (OUTPATIENT)
Dept: MEDICAL GROUP | Facility: PHYSICIAN GROUP | Age: 65
End: 2020-02-25

## 2020-03-06 SDOH — HEALTH STABILITY: MENTAL HEALTH: HOW OFTEN DO YOU HAVE 6 OR MORE DRINKS ON ONE OCCASION?: NEVER

## 2020-03-06 SDOH — HEALTH STABILITY: MENTAL HEALTH: HOW OFTEN DO YOU HAVE A DRINK CONTAINING ALCOHOL?: NEVER

## 2020-03-12 ENCOUNTER — OFFICE VISIT (OUTPATIENT)
Dept: MEDICAL GROUP | Facility: PHYSICIAN GROUP | Age: 65
End: 2020-03-12
Payer: MEDICARE

## 2020-03-12 VITALS
HEART RATE: 64 BPM | SYSTOLIC BLOOD PRESSURE: 122 MMHG | OXYGEN SATURATION: 96 % | TEMPERATURE: 98.4 F | WEIGHT: 211 LBS | RESPIRATION RATE: 14 BRPM | BODY MASS INDEX: 30.21 KG/M2 | HEIGHT: 70 IN | DIASTOLIC BLOOD PRESSURE: 80 MMHG

## 2020-03-12 DIAGNOSIS — Z12.11 SCREENING FOR COLON CANCER: ICD-10-CM

## 2020-03-12 DIAGNOSIS — Z12.31 ENCOUNTER FOR SCREENING MAMMOGRAM FOR BREAST CANCER: ICD-10-CM

## 2020-03-12 DIAGNOSIS — Z11.59 NEED FOR HEPATITIS C SCREENING TEST: ICD-10-CM

## 2020-03-12 DIAGNOSIS — R73.09 ELEVATED GLUCOSE: ICD-10-CM

## 2020-03-12 DIAGNOSIS — R53.83 FATIGUE, UNSPECIFIED TYPE: ICD-10-CM

## 2020-03-12 DIAGNOSIS — E78.00 ELEVATED CHOLESTEROL: ICD-10-CM

## 2020-03-12 DIAGNOSIS — Z78.0 POSTMENOPAUSAL: ICD-10-CM

## 2020-03-12 DIAGNOSIS — E55.9 AVITAMINOSIS D: ICD-10-CM

## 2020-03-12 PROCEDURE — G0439 PPPS, SUBSEQ VISIT: HCPCS | Performed by: NURSE PRACTITIONER

## 2020-03-12 ASSESSMENT — PATIENT HEALTH QUESTIONNAIRE - PHQ9: CLINICAL INTERPRETATION OF PHQ2 SCORE: 0

## 2020-03-12 ASSESSMENT — ACTIVITIES OF DAILY LIVING (ADL): BATHING_REQUIRES_ASSISTANCE: 0

## 2020-03-12 ASSESSMENT — ENCOUNTER SYMPTOMS: GENERAL WELL-BEING: GOOD

## 2020-03-12 ASSESSMENT — FIBROSIS 4 INDEX: FIB4 SCORE: 0.8

## 2020-03-12 NOTE — PROGRESS NOTES
Chief Complaint   Patient presents with   • Annual Wellness Visit         HPI:  Mckenzie is a 64 y.o. here for Medicare Annual Wellness Visit        Patient Active Problem List    Diagnosis Date Noted   • Screening for colorectal cancer 04/16/2019   • Elevated glucose 04/16/2019   • BMI 31.0-31.9,adult 01/25/2018   • Need for vaccination 01/25/2018   • Glaucoma 01/25/2018       Current Outpatient Medications   Medication Sig Dispense Refill   • latanoprost (XALATAN) 0.005 % Solution Place 1 Drop in both eyes every evening.       No current facility-administered medications for this visit.         Patient is taking medications as noted in medication list.  Current supplements as per medication list.     Allergies: Patient has no known allergies.    Current social contact/activities: Hanging out with friends.     Is patient current with immunizations? No, due for SHINGRIX (Shingles). Patient is interested in receiving NONE today. Not available.     She  reports that she has never smoked. She has never used smokeless tobacco. She reports that she does not drink alcohol or use drugs.  Counseling given: Yes        DPA/Advanced directive: Patient has Advanced Directive, but it is not on file. Instructed to bring in a copy to scan into their chart.    ROS:    Gait: Uses no assistive device   Ostomy: No   Other tubes: No   Amputations: No   Chronic oxygen use No   Last eye exam 2020   Wears hearing aids: No   : Reports urinary leakage during the last 6 months that has not interfered at all with their daily activities or sleep.   Annual Health Assessment Questions:    1.  Are you currently engaging in any exercise or physical activity? No    2.  How would you describe your mood or emotional well-being today? good    3.  Have you had any falls in the last year? No    4.  Have you noticed any problems with your balance or had difficulty walking? No    5.  In the last six months have you experienced any leakage of urine?  Yes    6. DPA/Advanced Directive: Patient has Advanced Directive, but it is not on file. Instructed to bring in a copy to scan into their chart.      Screening:        Depression Screening    Little interest or pleasure in doing things?  0 - not at all  Feeling down, depressed, or hopeless? 0 - not at all  Patient Health Questionnaire Score: 0    If depressive symptoms identified deferred to follow up visit unless specifically addressed in assessment and plan.    Interpretation of PHQ-9 Total Score   Score Severity   1-4 No Depression   5-9 Mild Depression   10-14 Moderate Depression   15-19 Moderately Severe Depression   20-27 Severe Depression    Screening for Cognitive Impairment    Three Minute Recall (village, kitchen, baby)  3/3    Ishan clock face with all 12 numbers and set the hands to show 10 past 10.  Yes    If cognitive concerns identified, deferred for follow up unless specifically addressed in assessment and plan.    Fall Risk Assessment    Has the patient had two or more falls in the last year or any fall with injury in the last year?  No  If fall risk identified, deferred for follow up unless specifically addressed in assessment and plan.    Safety Assessment    Throw rugs on floor.  No  Handrails on all stairs.  No  Good lighting in all hallways.  Yes  Difficulty hearing.  No  Patient counseled about all safety risks that were identified.    Functional Assessment ADLs    Are there any barriers preventing you from cooking for yourself or meeting nutritional needs?  No.    Are there any barriers preventing you from driving safely or obtaining transportation?  No.    Are there any barriers preventing you from using a telephone or calling for help?  No.    Are there any barriers preventing you from shopping?  No.    Are there any barriers preventing you from taking care of your own finances?  No.    Are there any barriers preventing you from managing your medications?  No.    Are there any barriers  preventing you from showering, bathing or dressing yourself?  No.    Are you currently engaging in any exercise or physical activity?  No.     What is your perception of your health?  Good.    Health Maintenance Summary                HEPATITIS C SCREENING Overdue 1955     COLONOSCOPY Overdue 12/26/2003     IMM ZOSTER VACCINES Overdue 10/27/2016      Done 9/1/2016 Imm Admin: Zoster Vaccine Live (ZVL) (Zostavax)    BONE DENSITY Overdue 12/26/2018     MAMMOGRAM Overdue 2/1/2020      Done 2/1/2019 MA-SCREENING MAMMO BILAT W/TOMOSYNTHESIS W/CAD     Patient has more history with this topic...    IMM PNEUMOCOCCAL VACCINE: 65+ Years Next Due 4/16/2020      Done 4/16/2019 Imm Admin: Pneumococcal Conjugate Vaccine (Prevnar/PCV-13)    IMM DTaP/Tdap/Td Vaccine Next Due 1/25/2028      Done 1/25/2018 Imm Admin: Tdap Vaccine     Patient has more history with this topic...          Patient Care Team:  TAMIKA Jarquin as PCP - General (Family Medicine)  Gentry Farley M.D. as Consulting Physician (Ophthalmology)    Social History     Tobacco Use   • Smoking status: Never Smoker   • Smokeless tobacco: Never Used   Substance Use Topics   • Alcohol use: No     Frequency: Never     Binge frequency: Never   • Drug use: No     Family History   Problem Relation Age of Onset   • Heart Attack Mother 76   • Kidney Disease Mother    • Lung Cancer Father 56   • Alcohol/Drug Father         Alcoholic   • Other Sister         Low BP   • Other Brother         Back and joint issues   • Lung Disease Maternal Grandmother    • No Known Problems Maternal Grandfather    • No Known Problems Paternal Grandmother    • Other Paternal Grandfather         Suicide   • Alcohol/Drug Paternal Grandfather    • Hypertension Sister    • No Known Problems Son    • No Known Problems Son      She  has no past medical history on file.   Past Surgical History:   Procedure Laterality Date   • WRIST ORIF Left 03/2017   • FOOT SURGERY Right 80's   •  "TONSILLECTOMY  60's           Exam:     Blood Pressure 122/80   Pulse 64   Temperature 36.9 °C (98.4 °F)   Respiration 14   Height 1.778 m (5' 10\")   Weight 95.7 kg (211 lb)   Oxygen Saturation 96%  Body mass index is 30.28 kg/m².    Hearing good.    Dentition good  Alert, oriented in no acute distress.  Eye contact is good, speech goal directed, affect calm      Assessment and Plan. The following treatment and monitoring plan is recommended:    1. Screening for colon cancer  Cologuard Colon Cancer Screening    Subsequent Annual Wellness Visit - Includes PPPS ()   2. Encounter for screening mammogram for breast cancer  MA-SCREENING MAMMO BILAT W/TOMOSYNTHESIS W/CAD    Subsequent Annual Wellness Visit - Includes PPPS ()   3. Elevated glucose  Comp Metabolic Panel   4. Fatigue, unspecified type  TSH   5. Elevated cholesterol  CBC WITH DIFFERENTIAL    Comp Metabolic Panel    Lipid Profile   6. Avitaminosis D  VITAMIN D,25 HYDROXY   7. Need for hepatitis C screening test  HEP C VIRUS ANTIBODY   8. Postmenopausal  DS-BONE DENSITY STUDY (DEXA)    Subsequent Annual Wellness Visit - Includes PPPS ()     Encounter for screening mammogram for breast cancer  Due for mammogram.    Screening for colon cancer  Due for Cologuard.    Postmenopausal  Due for DEXA.    Elevated glucose  Due for labs.        Services suggested: No services needed at this time  Health Care Screening recommendations as per orders if indicated.  Referrals offered: PT/OT/Nutrition counseling/Behavioral Health/Smoking cessation as per orders if indicated.    Discussion today about general wellness and lifestyle habits:    · Prevent falls and reduce trip hazards; Cautioned about securing or removing rugs.  · Have a working fire alarm and carbon monoxide detector;   · Engage in regular physical activity and social activities       Follow-up: Return if symptoms worsen or fail to improve.  "

## 2020-03-15 PROBLEM — Z12.11 SCREENING FOR COLON CANCER: Status: ACTIVE | Noted: 2020-03-15

## 2020-03-15 PROBLEM — Z12.31 ENCOUNTER FOR SCREENING MAMMOGRAM FOR BREAST CANCER: Status: ACTIVE | Noted: 2020-03-15

## 2020-03-15 PROBLEM — Z78.0 POSTMENOPAUSAL: Status: ACTIVE | Noted: 2020-03-15

## 2020-03-18 ENCOUNTER — HOSPITAL ENCOUNTER (OUTPATIENT)
Dept: LAB | Facility: MEDICAL CENTER | Age: 65
End: 2020-03-18
Attending: NURSE PRACTITIONER
Payer: MEDICARE

## 2020-03-18 DIAGNOSIS — Z11.59 NEED FOR HEPATITIS C SCREENING TEST: ICD-10-CM

## 2020-03-18 DIAGNOSIS — E78.00 ELEVATED CHOLESTEROL: ICD-10-CM

## 2020-03-18 DIAGNOSIS — E55.9 AVITAMINOSIS D: ICD-10-CM

## 2020-03-18 DIAGNOSIS — R53.83 FATIGUE, UNSPECIFIED TYPE: ICD-10-CM

## 2020-03-18 DIAGNOSIS — R73.09 ELEVATED GLUCOSE: ICD-10-CM

## 2020-03-18 LAB
ALBUMIN SERPL BCP-MCNC: 4.8 G/DL (ref 3.2–4.9)
ALBUMIN/GLOB SERPL: 1.5 G/DL
ALP SERPL-CCNC: 111 U/L (ref 30–99)
ALT SERPL-CCNC: 15 U/L (ref 2–50)
ANION GAP SERPL CALC-SCNC: 12 MMOL/L (ref 7–16)
AST SERPL-CCNC: 15 U/L (ref 12–45)
BASOPHILS # BLD AUTO: 0.8 % (ref 0–1.8)
BASOPHILS # BLD: 0.04 K/UL (ref 0–0.12)
BILIRUB SERPL-MCNC: 0.7 MG/DL (ref 0.1–1.5)
BUN SERPL-MCNC: 15 MG/DL (ref 8–22)
CALCIUM SERPL-MCNC: 9.5 MG/DL (ref 8.5–10.5)
CHLORIDE SERPL-SCNC: 102 MMOL/L (ref 96–112)
CHOLEST SERPL-MCNC: 210 MG/DL (ref 100–199)
CO2 SERPL-SCNC: 25 MMOL/L (ref 20–33)
CREAT SERPL-MCNC: 0.6 MG/DL (ref 0.5–1.4)
EOSINOPHIL # BLD AUTO: 0.07 K/UL (ref 0–0.51)
EOSINOPHIL NFR BLD: 1.5 % (ref 0–6.9)
ERYTHROCYTE [DISTWIDTH] IN BLOOD BY AUTOMATED COUNT: 43.8 FL (ref 35.9–50)
FASTING STATUS PATIENT QL REPORTED: NORMAL
GLOBULIN SER CALC-MCNC: 3.1 G/DL (ref 1.9–3.5)
GLUCOSE SERPL-MCNC: 85 MG/DL (ref 65–99)
HCT VFR BLD AUTO: 52.5 % (ref 37–47)
HDLC SERPL-MCNC: 67 MG/DL
HGB BLD-MCNC: 17.2 G/DL (ref 12–16)
IMM GRANULOCYTES # BLD AUTO: 0.02 K/UL (ref 0–0.11)
IMM GRANULOCYTES NFR BLD AUTO: 0.4 % (ref 0–0.9)
LDLC SERPL CALC-MCNC: 118 MG/DL
LYMPHOCYTES # BLD AUTO: 1.43 K/UL (ref 1–4.8)
LYMPHOCYTES NFR BLD: 29.9 % (ref 22–41)
MCH RBC QN AUTO: 30.4 PG (ref 27–33)
MCHC RBC AUTO-ENTMCNC: 32.8 G/DL (ref 33.6–35)
MCV RBC AUTO: 92.9 FL (ref 81.4–97.8)
MONOCYTES # BLD AUTO: 0.27 K/UL (ref 0–0.85)
MONOCYTES NFR BLD AUTO: 5.6 % (ref 0–13.4)
NEUTROPHILS # BLD AUTO: 2.95 K/UL (ref 2–7.15)
NEUTROPHILS NFR BLD: 61.8 % (ref 44–72)
NRBC # BLD AUTO: 0 K/UL
NRBC BLD-RTO: 0 /100 WBC
PLATELET # BLD AUTO: 256 K/UL (ref 164–446)
PMV BLD AUTO: 9.9 FL (ref 9–12.9)
POTASSIUM SERPL-SCNC: 4.2 MMOL/L (ref 3.6–5.5)
PROT SERPL-MCNC: 7.9 G/DL (ref 6–8.2)
RBC # BLD AUTO: 5.65 M/UL (ref 4.2–5.4)
SODIUM SERPL-SCNC: 139 MMOL/L (ref 135–145)
TRIGL SERPL-MCNC: 125 MG/DL (ref 0–149)
WBC # BLD AUTO: 4.8 K/UL (ref 4.8–10.8)

## 2020-03-18 PROCEDURE — 36415 COLL VENOUS BLD VENIPUNCTURE: CPT

## 2020-03-18 PROCEDURE — 84443 ASSAY THYROID STIM HORMONE: CPT

## 2020-03-18 PROCEDURE — 80053 COMPREHEN METABOLIC PANEL: CPT

## 2020-03-18 PROCEDURE — 86803 HEPATITIS C AB TEST: CPT

## 2020-03-18 PROCEDURE — 80061 LIPID PANEL: CPT

## 2020-03-18 PROCEDURE — 85025 COMPLETE CBC W/AUTO DIFF WBC: CPT

## 2020-03-18 PROCEDURE — 82306 VITAMIN D 25 HYDROXY: CPT

## 2020-03-19 LAB
25(OH)D3 SERPL-MCNC: 26 NG/ML (ref 30–100)
HCV AB SER QL: NORMAL
TSH SERPL DL<=0.005 MIU/L-ACNC: 1.58 UIU/ML (ref 0.38–5.33)

## 2020-06-23 ENCOUNTER — HOSPITAL ENCOUNTER (OUTPATIENT)
Dept: RADIOLOGY | Facility: MEDICAL CENTER | Age: 65
End: 2020-06-23
Attending: NURSE PRACTITIONER
Payer: MEDICARE

## 2020-06-23 DIAGNOSIS — Z12.31 ENCOUNTER FOR SCREENING MAMMOGRAM FOR BREAST CANCER: ICD-10-CM

## 2020-06-23 DIAGNOSIS — Z78.0 POSTMENOPAUSAL: ICD-10-CM

## 2020-06-23 PROCEDURE — 77067 SCR MAMMO BI INCL CAD: CPT

## 2020-06-23 PROCEDURE — 77080 DXA BONE DENSITY AXIAL: CPT

## 2020-10-12 ENCOUNTER — NON-PROVIDER VISIT (OUTPATIENT)
Dept: MEDICAL GROUP | Facility: PHYSICIAN GROUP | Age: 65
End: 2020-10-12
Payer: MEDICARE

## 2020-10-12 DIAGNOSIS — Z23 NEED FOR VACCINATION: ICD-10-CM

## 2020-10-12 PROCEDURE — 90662 IIV NO PRSV INCREASED AG IM: CPT | Performed by: INTERNAL MEDICINE

## 2020-10-12 PROCEDURE — G0008 ADMIN INFLUENZA VIRUS VAC: HCPCS | Performed by: INTERNAL MEDICINE

## 2020-10-12 NOTE — PROGRESS NOTES
"Mckenzie Hitchcock is a 64 y.o. female here for a non-provider visit for:   FLU    Reason for immunization: Annual Flu Vaccine  Immunization records indicate need for vaccine: Yes, confirmed with Epic and confirmed with [place] [employer]  Minimum interval has been met for this vaccine: Yes  ABN completed: Not Indicated    Order and dose verified by: AM  VIS Dated  08/15/19 was given to patient: Yes  All IAC Questionnaire questions were answered \"No.\"    Patient tolerated injection and no adverse effects were observed or reported: Yes    Pt scheduled for next dose in series: No    "

## 2020-12-30 ENCOUNTER — TELEMEDICINE (OUTPATIENT)
Dept: MEDICAL GROUP | Facility: PHYSICIAN GROUP | Age: 65
End: 2020-12-30
Payer: MEDICARE

## 2020-12-30 VITALS
RESPIRATION RATE: 12 BRPM | DIASTOLIC BLOOD PRESSURE: 99 MMHG | BODY MASS INDEX: 28.77 KG/M2 | WEIGHT: 201 LBS | HEART RATE: 106 BPM | HEIGHT: 70 IN | SYSTOLIC BLOOD PRESSURE: 121 MMHG

## 2020-12-30 DIAGNOSIS — E55.9 VITAMIN D DEFICIENCY: ICD-10-CM

## 2020-12-30 DIAGNOSIS — E78.5 DYSLIPIDEMIA: ICD-10-CM

## 2020-12-30 DIAGNOSIS — Z23 NEED FOR VACCINATION: ICD-10-CM

## 2020-12-30 DIAGNOSIS — H40.9 GLAUCOMA, UNSPECIFIED GLAUCOMA TYPE, UNSPECIFIED LATERALITY: ICD-10-CM

## 2020-12-30 DIAGNOSIS — J39.2 DRY THROAT: ICD-10-CM

## 2020-12-30 DIAGNOSIS — R73.03 PREDIABETES: ICD-10-CM

## 2020-12-30 DIAGNOSIS — Z00.00 GENERAL MEDICAL EXAM: ICD-10-CM

## 2020-12-30 PROCEDURE — 99213 OFFICE O/P EST LOW 20 MIN: CPT | Performed by: PHYSICIAN ASSISTANT

## 2020-12-30 ASSESSMENT — FIBROSIS 4 INDEX: FIB4 SCORE: 1.01

## 2020-12-30 NOTE — ASSESSMENT & PLAN NOTE
This is a chronic condition.   Latest Labs:   Lab Results   Component Value Date/Time    CHOLSTRLTOT 210 (H) 03/18/2020 11:04 AM     (H) 03/18/2020 11:04 AM    HDL 67 03/18/2020 11:04 AM    TRIGLYCERIDE 125 03/18/2020 11:04 AM      Medications: none    Risk calculator: The 10-year ASCVD risk score (Puneet SCHMITT Jr., et al., 2013) is: 5.9%

## 2020-12-30 NOTE — PROGRESS NOTES
Virtual Visit: Established Patient   This visit was conducted via Zoom using secure and encrypted videoconferencing technology. The patient was in a private location in the state of [place].    The patient's identity was confirmed and verbal consent was obtained for this virtual visit.    Subjective:   CC:   Chief Complaint   Patient presents with   • Establish Care   • Dyslipidemia       Mckenzie Hitchcock is a 65 y.o. female presenting for evaluation and management of:    Health Maintenance:   Cologuard 03/2020- negative. Due again 2023.   Chickenpox as child, had Zostavax. Print Shingrix.   Due pneumonia vaccine. Recommend.     Glaucoma  On Xalatan nightly. Followed by [facility].     Dry throat  Dry throat, thinks it may be the dry weather. Dry nose as well. No allergies. Looking for humidifier.     Dyslipidemia  This is a chronic condition.   Latest Labs:   Lab Results   Component Value Date/Time    CHOLSTRLTOT 210 (H) 03/18/2020 11:04 AM     (H) 03/18/2020 11:04 AM    HDL 67 03/18/2020 11:04 AM    TRIGLYCERIDE 125 03/18/2020 11:04 AM      Medications: none    Risk calculator: The 10-year ASCVD risk score (Quinault DC Jr., et al., 2013) is: 5.9%       Vitamin D deficiency  This is a  chronic condition.   Supplementation: Takes Vit D BID.  800 IU.   Last Vitamin D level:   VIT D:   Lab Results   Component Value Date/Time    25HYDROXY 26 (L) 03/18/2020 1104     Patient denies any muscle aches or fatigue.         ROS   Denies any recent fevers or chills. No nausea or vomiting. No chest pains or shortness of breath.     No Known Allergies    Current medicines (including changes today)  Current Outpatient Medications   Medication Sig Dispense Refill   • Zoster Vac Recomb Adjuvanted (SHINGRIX) 50 MCG/0.5ML Recon Susp Inject 0.5 mL into the shoulder, thigh, or buttocks one time for 1 dose. 0.5 mL 0   • latanoprost (XALATAN) 0.005 % Solution Place 1 Drop in both eyes every evening.       No current  "facility-administered medications for this visit.        Patient Active Problem List    Diagnosis Date Noted   • Dry throat 12/30/2020   • Dyslipidemia 12/30/2020   • Vitamin D deficiency 12/30/2020   • Screening for colon cancer 03/15/2020   • Encounter for screening mammogram for breast cancer 03/15/2020   • Postmenopausal 03/15/2020   • Screening for colorectal cancer 04/16/2019   • Elevated glucose 04/16/2019   • BMI 31.0-31.9,adult 01/25/2018   • Need for vaccination 01/25/2018   • Glaucoma 01/25/2018       Family History   Problem Relation Age of Onset   • Heart Attack Mother 76   • Kidney Disease Mother    • Lung Cancer Father 56   • Alcohol/Drug Father         Alcoholic   • Other Sister         Low BP   • Other Brother         Back and joint issues   • Lung Disease Maternal Grandmother    • No Known Problems Maternal Grandfather    • No Known Problems Paternal Grandmother    • Other Paternal Grandfather         Suicide   • Alcohol/Drug Paternal Grandfather    • Hypertension Sister    • No Known Problems Son    • No Known Problems Son        She  has no past medical history on file.  She  has a past surgical history that includes wrist orif (Left, 03/2017); foot surgery (Right, 80's); and tonsillectomy (60's).       Objective:   /99   Pulse (!) 106   Ht 1.778 m (5' 10\")   Wt 91.2 kg (201 lb)   BMI 28.84 kg/m²     Physical Exam:  Constitutional: Alert, no distress, well-groomed.  Skin: No rashes in visible areas.  Eye: Round. Conjunctiva clear, lids normal. No icterus.   ENMT: Lips pink without lesions, good dentition, moist mucous membranes. Phonation normal.  Neck: No masses, no thyromegaly. Moves freely without pain.  Respiratory: Unlabored respiratory effort, no cough or audible wheeze  Psych: Alert and oriented x3, normal affect and mood.       Assessment and Plan:   The following treatment plan was discussed:     1. Glaucoma, unspecified glaucoma type, unspecified laterality  Followed by " ophthalmology, continue eyedrops.  2. Need for vaccination  - Zoster Vac Recomb Adjuvanted (SHINGRIX) 50 MCG/0.5ML Recon Susp; Inject 0.5 mL into the shoulder, thigh, or buttocks one time for 1 dose.  Dispense: 0.5 mL; Refill: 0    3. Dry throat  Dry throat most likely secondary due to the environment.  She is going to look into getting humidifier.  4. Dyslipidemia  - Lipid Profile; Future  Chronic condition, working on lifestyle modifications, due for updated labs.  No medications currently.  5. Vitamin D deficiency  - VITAMIN D,25 HYDROXY; Future  Chronic condition, continue supplementing vitamin D 800 IUs twice daily.  She takes a calcium supplement.  6. Prediabetes  - CBC WITH DIFFERENTIAL; Future  - Comp Metabolic Panel; Future  - HEMOGLOBIN A1C; Future  Fasting blood sugar in the past has been elevated.  Due for updated hemoglobin A1c.  No family history diabetes.  7. General medical exam  - CBC WITH DIFFERENTIAL; Future        Follow-up: Return in about 6 months (around 6/30/2021) for Follow up on labs.        The patient verbalized agreement and understanding of current plan. All questions and concerns were addressed at time of visit.    Please note that this dictation was created using voice recognition software. I have made every reasonable attempt to correct obvious errors, but I expect that there are errors of grammar and possibly content that I did not discover before finalizing the note.

## 2020-12-30 NOTE — ASSESSMENT & PLAN NOTE
This is a  chronic condition.   Supplementation: Takes Vit D BID.  800 IU.   Last Vitamin D level:   VIT D:   Lab Results   Component Value Date/Time    25HYDROXY 26 (L) 03/18/2020 1104     Patient denies any muscle aches or fatigue.

## 2020-12-30 NOTE — ASSESSMENT & PLAN NOTE
Dry throat, thinks it may be the dry weather. Dry nose as well. No allergies. Looking for humidifier.

## 2021-02-26 ENCOUNTER — OFFICE VISIT (OUTPATIENT)
Dept: URGENT CARE | Facility: PHYSICIAN GROUP | Age: 66
End: 2021-02-26
Payer: MEDICARE

## 2021-02-26 ENCOUNTER — TELEPHONE (OUTPATIENT)
Dept: MEDICAL GROUP | Facility: PHYSICIAN GROUP | Age: 66
End: 2021-02-26

## 2021-02-26 VITALS
OXYGEN SATURATION: 97 % | BODY MASS INDEX: 29.92 KG/M2 | TEMPERATURE: 98.3 F | DIASTOLIC BLOOD PRESSURE: 94 MMHG | RESPIRATION RATE: 20 BRPM | SYSTOLIC BLOOD PRESSURE: 138 MMHG | WEIGHT: 209 LBS | HEIGHT: 70 IN | HEART RATE: 115 BPM

## 2021-02-26 DIAGNOSIS — J02.9 PHARYNGITIS, UNSPECIFIED ETIOLOGY: ICD-10-CM

## 2021-02-26 DIAGNOSIS — J02.0 STREP PHARYNGITIS: ICD-10-CM

## 2021-02-26 LAB
INT CON NEG: NEGATIVE
INT CON POS: POSITIVE
S PYO AG THROAT QL: POSITIVE

## 2021-02-26 PROCEDURE — 87880 STREP A ASSAY W/OPTIC: CPT | Performed by: NURSE PRACTITIONER

## 2021-02-26 PROCEDURE — 99214 OFFICE O/P EST MOD 30 MIN: CPT | Performed by: NURSE PRACTITIONER

## 2021-02-26 RX ORDER — DEXAMETHASONE SODIUM PHOSPHATE 10 MG/ML
10 INJECTION INTRAMUSCULAR; INTRAVENOUS ONCE
Status: COMPLETED | OUTPATIENT
Start: 2021-02-26 | End: 2021-02-26

## 2021-02-26 RX ORDER — AMOXICILLIN 500 MG/1
500 CAPSULE ORAL 2 TIMES DAILY
Qty: 20 CAPSULE | Refills: 0 | Status: SHIPPED | OUTPATIENT
Start: 2021-02-26 | End: 2021-03-08

## 2021-02-26 RX ADMIN — DEXAMETHASONE SODIUM PHOSPHATE 10 MG: 10 INJECTION INTRAMUSCULAR; INTRAVENOUS at 13:30

## 2021-02-26 ASSESSMENT — ENCOUNTER SYMPTOMS
ABDOMINAL PAIN: 0
TROUBLE SWALLOWING: 1
SORE THROAT: 1
CHILLS: 0
HEADACHES: 0
COUGH: 0
SPUTUM PRODUCTION: 0
SHORTNESS OF BREATH: 0
FEVER: 0
HEMOPTYSIS: 0
NAUSEA: 0
DIARRHEA: 0
WHEEZING: 0
SWOLLEN GLANDS: 1
VOMITING: 0

## 2021-02-26 ASSESSMENT — FIBROSIS 4 INDEX: FIB4 SCORE: 1.01

## 2021-02-26 NOTE — TELEPHONE ENCOUNTER
I spoke Mckenzie.    Mckenzie stated that she is having trouble swallowing.    Mckenzie advised to go to ED or UC for further evaluation.

## 2021-02-26 NOTE — PROGRESS NOTES
Subjective:     Mckenzie Hitchcock is a 65 y.o. female who presents for Pharyngitis (sore throat, ear pain; 1x week)      Pharyngitis   This is a new problem. The current episode started in the past 7 days (1 week ago she developed a sore throat. She states the pain is mild but she is having a difficult time swallowing her food to the point where she is spitting out her food. This is awakening her at night. ). The problem has been gradually worsening. Neither side of throat is experiencing more pain than the other. There has been no fever. Associated symptoms include ear pain, swollen glands and trouble swallowing. Pertinent negatives include no abdominal pain, congestion, coughing, diarrhea, headaches, shortness of breath or vomiting. Treatments tried: Hot tea. The treatment provided moderate relief.         Review of Systems   Constitutional: Negative for chills, fever and malaise/fatigue.   HENT: Positive for ear pain, sore throat and trouble swallowing. Negative for congestion.    Respiratory: Negative for cough, hemoptysis, sputum production, shortness of breath and wheezing.    Gastrointestinal: Negative for abdominal pain, diarrhea, nausea and vomiting.   Skin: Negative for itching and rash.   Neurological: Negative for headaches.   All other systems reviewed and are negative.      PMH: No past medical history on file.  ALLERGIES: No Known Allergies  SURGHX:   Past Surgical History:   Procedure Laterality Date   • WRIST ORIF Left 03/2017   • FOOT SURGERY Right 80's   • TONSILLECTOMY  60's     SOCHX:   Social History     Socioeconomic History   • Marital status:      Spouse name: Not on file   • Number of children: Not on file   • Years of education: Not on file   • Highest education level: Not on file   Occupational History   • Not on file   Tobacco Use   • Smoking status: Never Smoker   • Smokeless tobacco: Never Used   Substance and Sexual Activity   • Alcohol use: No   • Drug use: No   • Sexual  "activity: Never   Other Topics Concern   • Not on file   Social History Narrative   • Not on file     Social Determinants of Health     Financial Resource Strain:    • Difficulty of Paying Living Expenses:    Food Insecurity:    • Worried About Running Out of Food in the Last Year:    • Ran Out of Food in the Last Year:    Transportation Needs:    • Lack of Transportation (Medical):    • Lack of Transportation (Non-Medical):    Physical Activity:    • Days of Exercise per Week:    • Minutes of Exercise per Session:    Stress:    • Feeling of Stress :    Social Connections:    • Frequency of Communication with Friends and Family:    • Frequency of Social Gatherings with Friends and Family:    • Attends Sabianism Services:    • Active Member of Clubs or Organizations:    • Attends Club or Organization Meetings:    • Marital Status:    Intimate Partner Violence:    • Fear of Current or Ex-Partner:    • Emotionally Abused:    • Physically Abused:    • Sexually Abused:      FH:   Family History   Problem Relation Age of Onset   • Heart Attack Mother 76   • Kidney Disease Mother    • Lung Cancer Father 56   • Alcohol/Drug Father         Alcoholic   • Other Sister         Low BP   • Other Brother         Back and joint issues   • Lung Disease Maternal Grandmother    • No Known Problems Maternal Grandfather    • No Known Problems Paternal Grandmother    • Other Paternal Grandfather         Suicide   • Alcohol/Drug Paternal Grandfather    • Hypertension Sister    • No Known Problems Son    • No Known Problems Son          Objective:   /94 (BP Location: Left arm, Patient Position: Sitting, BP Cuff Size: Adult)   Pulse (!) 115   Temp 36.8 °C (98.3 °F) (Temporal)   Resp 20   Ht 1.778 m (5' 10\") Comment: per pt  Wt 94.8 kg (209 lb)   SpO2 97%   BMI 29.99 kg/m²     Physical Exam  Vitals and nursing note reviewed.   Constitutional:       General: She is not in acute distress.     Appearance: Normal appearance. She is " ill-appearing.   HENT:      Head: Normocephalic and atraumatic.      Right Ear: Tympanic membrane, ear canal and external ear normal. There is no impacted cerumen.      Left Ear: Tympanic membrane, ear canal and external ear normal. There is no impacted cerumen.      Nose: No congestion or rhinorrhea.      Mouth/Throat:      Mouth: Mucous membranes are moist.      Pharynx: Oropharyngeal exudate and posterior oropharyngeal erythema present.   Eyes:      Extraocular Movements: Extraocular movements intact.      Pupils: Pupils are equal, round, and reactive to light.   Cardiovascular:      Rate and Rhythm: Regular rhythm. Tachycardia present.      Pulses: Normal pulses.      Heart sounds: Normal heart sounds.   Pulmonary:      Effort: Pulmonary effort is normal. No respiratory distress.      Breath sounds: Normal breath sounds. No stridor. No wheezing, rhonchi or rales.   Chest:      Chest wall: No tenderness.   Abdominal:      General: Abdomen is flat. Bowel sounds are normal.      Palpations: Abdomen is soft.      Tenderness: There is no abdominal tenderness. There is no right CVA tenderness or left CVA tenderness.   Musculoskeletal:         General: Normal range of motion.      Cervical back: Normal range of motion and neck supple. Tenderness present.   Lymphadenopathy:      Cervical: Cervical adenopathy present.   Skin:     General: Skin is warm and dry.      Capillary Refill: Capillary refill takes less than 2 seconds.   Neurological:      General: No focal deficit present.      Mental Status: She is alert and oriented to person, place, and time. Mental status is at baseline.   Psychiatric:         Mood and Affect: Mood normal.         Behavior: Behavior normal.         Thought Content: Thought content normal.         Judgment: Judgment normal.       POCT strep: positive A  Assessment/Plan:   Assessment    1. Strep pharyngitis  amoxicillin (AMOXIL) 500 MG Cap    dexamethasone (DECADRON) injection (check route  below) 10 mg   We discussed supportive measures including humidifier, warm salt water gargles, over-the-counter Cepacol throat lozenges, rest  and increased fluids. Pt was encouraged to seek treatment back in the ER or urgent care for worsening symptoms,  fever greater than 100.5, wheezes or shortness of breath.      AVS handout given and reviewed with patient. Pt educated on red flags and when to seek treatment back in ER or UC.

## 2021-03-03 DIAGNOSIS — Z23 NEED FOR VACCINATION: ICD-10-CM

## 2021-03-11 ENCOUNTER — IMMUNIZATION (OUTPATIENT)
Dept: FAMILY PLANNING/WOMEN'S HEALTH CLINIC | Facility: IMMUNIZATION CENTER | Age: 66
End: 2021-03-11
Attending: INTERNAL MEDICINE
Payer: MEDICARE

## 2021-03-11 DIAGNOSIS — Z23 ENCOUNTER FOR VACCINATION: Primary | ICD-10-CM

## 2021-03-11 DIAGNOSIS — Z23 NEED FOR VACCINATION: ICD-10-CM

## 2021-03-11 PROCEDURE — 91300 PFIZER SARS-COV-2 VACCINE: CPT

## 2021-03-11 PROCEDURE — 0001A PFIZER SARS-COV-2 VACCINE: CPT

## 2021-04-02 ENCOUNTER — IMMUNIZATION (OUTPATIENT)
Dept: FAMILY PLANNING/WOMEN'S HEALTH CLINIC | Facility: IMMUNIZATION CENTER | Age: 66
End: 2021-04-02
Attending: INTERNAL MEDICINE
Payer: MEDICARE

## 2021-04-02 DIAGNOSIS — Z23 ENCOUNTER FOR VACCINATION: Primary | ICD-10-CM

## 2021-04-02 PROCEDURE — 91300 PFIZER SARS-COV-2 VACCINE: CPT

## 2021-04-02 PROCEDURE — 0002A PFIZER SARS-COV-2 VACCINE: CPT

## 2021-06-22 ENCOUNTER — HOSPITAL ENCOUNTER (OUTPATIENT)
Dept: LAB | Facility: MEDICAL CENTER | Age: 66
End: 2021-06-22
Attending: PHYSICIAN ASSISTANT
Payer: MEDICARE

## 2021-06-22 DIAGNOSIS — E55.9 VITAMIN D DEFICIENCY: ICD-10-CM

## 2021-06-22 DIAGNOSIS — Z00.00 GENERAL MEDICAL EXAM: ICD-10-CM

## 2021-06-22 DIAGNOSIS — R73.03 PREDIABETES: ICD-10-CM

## 2021-06-22 DIAGNOSIS — E78.5 DYSLIPIDEMIA: ICD-10-CM

## 2021-06-22 LAB
25(OH)D3 SERPL-MCNC: 65 NG/ML (ref 30–100)
ALBUMIN SERPL BCP-MCNC: 4.7 G/DL (ref 3.2–4.9)
ALBUMIN/GLOB SERPL: 1.5 G/DL
ALP SERPL-CCNC: 80 U/L (ref 30–99)
ALT SERPL-CCNC: 12 U/L (ref 2–50)
ANION GAP SERPL CALC-SCNC: 13 MMOL/L (ref 7–16)
AST SERPL-CCNC: 16 U/L (ref 12–45)
BASOPHILS # BLD AUTO: 0.9 % (ref 0–1.8)
BASOPHILS # BLD: 0.05 K/UL (ref 0–0.12)
BILIRUB SERPL-MCNC: 1 MG/DL (ref 0.1–1.5)
BUN SERPL-MCNC: 18 MG/DL (ref 8–22)
CALCIUM SERPL-MCNC: 9.9 MG/DL (ref 8.5–10.5)
CHLORIDE SERPL-SCNC: 104 MMOL/L (ref 96–112)
CHOLEST SERPL-MCNC: 217 MG/DL (ref 100–199)
CO2 SERPL-SCNC: 25 MMOL/L (ref 20–33)
CREAT SERPL-MCNC: 0.82 MG/DL (ref 0.5–1.4)
EOSINOPHIL # BLD AUTO: 0.11 K/UL (ref 0–0.51)
EOSINOPHIL NFR BLD: 2.1 % (ref 0–6.9)
ERYTHROCYTE [DISTWIDTH] IN BLOOD BY AUTOMATED COUNT: 44.5 FL (ref 35.9–50)
EST. AVERAGE GLUCOSE BLD GHB EST-MCNC: 114 MG/DL
FASTING STATUS PATIENT QL REPORTED: NORMAL
GLOBULIN SER CALC-MCNC: 3.1 G/DL (ref 1.9–3.5)
GLUCOSE SERPL-MCNC: 90 MG/DL (ref 65–99)
HBA1C MFR BLD: 5.6 % (ref 4–5.6)
HCT VFR BLD AUTO: 52.1 % (ref 37–47)
HDLC SERPL-MCNC: 60 MG/DL
HGB BLD-MCNC: 17.2 G/DL (ref 12–16)
IMM GRANULOCYTES # BLD AUTO: 0.02 K/UL (ref 0–0.11)
IMM GRANULOCYTES NFR BLD AUTO: 0.4 % (ref 0–0.9)
LDLC SERPL CALC-MCNC: 128 MG/DL
LYMPHOCYTES # BLD AUTO: 1.49 K/UL (ref 1–4.8)
LYMPHOCYTES NFR BLD: 28.2 % (ref 22–41)
MCH RBC QN AUTO: 31.4 PG (ref 27–33)
MCHC RBC AUTO-ENTMCNC: 33 G/DL (ref 33.6–35)
MCV RBC AUTO: 95.2 FL (ref 81.4–97.8)
MONOCYTES # BLD AUTO: 0.33 K/UL (ref 0–0.85)
MONOCYTES NFR BLD AUTO: 6.3 % (ref 0–13.4)
NEUTROPHILS # BLD AUTO: 3.28 K/UL (ref 2–7.15)
NEUTROPHILS NFR BLD: 62.1 % (ref 44–72)
NRBC # BLD AUTO: 0 K/UL
NRBC BLD-RTO: 0 /100 WBC
PLATELET # BLD AUTO: 254 K/UL (ref 164–446)
PMV BLD AUTO: 10 FL (ref 9–12.9)
POTASSIUM SERPL-SCNC: 4.3 MMOL/L (ref 3.6–5.5)
PROT SERPL-MCNC: 7.8 G/DL (ref 6–8.2)
RBC # BLD AUTO: 5.47 M/UL (ref 4.2–5.4)
SODIUM SERPL-SCNC: 142 MMOL/L (ref 135–145)
TRIGL SERPL-MCNC: 145 MG/DL (ref 0–149)
WBC # BLD AUTO: 5.3 K/UL (ref 4.8–10.8)

## 2021-06-22 PROCEDURE — 80053 COMPREHEN METABOLIC PANEL: CPT

## 2021-06-22 PROCEDURE — 36415 COLL VENOUS BLD VENIPUNCTURE: CPT | Mod: GA

## 2021-06-22 PROCEDURE — 80061 LIPID PANEL: CPT

## 2021-06-22 PROCEDURE — 85025 COMPLETE CBC W/AUTO DIFF WBC: CPT

## 2021-06-22 PROCEDURE — 82306 VITAMIN D 25 HYDROXY: CPT

## 2021-06-22 PROCEDURE — 83036 HEMOGLOBIN GLYCOSYLATED A1C: CPT | Mod: GA

## 2021-06-23 ENCOUNTER — TELEPHONE (OUTPATIENT)
Dept: MEDICAL GROUP | Facility: PHYSICIAN GROUP | Age: 66
End: 2021-06-23

## 2021-06-23 SDOH — ECONOMIC STABILITY: TRANSPORTATION INSECURITY
IN THE PAST 12 MONTHS, HAS THE LACK OF TRANSPORTATION KEPT YOU FROM MEDICAL APPOINTMENTS OR FROM GETTING MEDICATIONS?: NO

## 2021-06-23 SDOH — ECONOMIC STABILITY: TRANSPORTATION INSECURITY
IN THE PAST 12 MONTHS, HAS LACK OF TRANSPORTATION KEPT YOU FROM MEETINGS, WORK, OR FROM GETTING THINGS NEEDED FOR DAILY LIVING?: NO

## 2021-06-23 SDOH — ECONOMIC STABILITY: HOUSING INSECURITY: IN THE LAST 12 MONTHS, HOW MANY PLACES HAVE YOU LIVED?: 1

## 2021-06-23 SDOH — ECONOMIC STABILITY: FOOD INSECURITY: WITHIN THE PAST 12 MONTHS, THE FOOD YOU BOUGHT JUST DIDN'T LAST AND YOU DIDN'T HAVE MONEY TO GET MORE.: NEVER TRUE

## 2021-06-23 SDOH — ECONOMIC STABILITY: HOUSING INSECURITY
IN THE LAST 12 MONTHS, WAS THERE A TIME WHEN YOU DID NOT HAVE A STEADY PLACE TO SLEEP OR SLEPT IN A SHELTER (INCLUDING NOW)?: NO

## 2021-06-23 SDOH — HEALTH STABILITY: PHYSICAL HEALTH: ON AVERAGE, HOW MANY DAYS PER WEEK DO YOU ENGAGE IN MODERATE TO STRENUOUS EXERCISE (LIKE A BRISK WALK)?: 1 DAY

## 2021-06-23 SDOH — ECONOMIC STABILITY: FOOD INSECURITY: WITHIN THE PAST 12 MONTHS, YOU WORRIED THAT YOUR FOOD WOULD RUN OUT BEFORE YOU GOT MONEY TO BUY MORE.: NEVER TRUE

## 2021-06-23 SDOH — ECONOMIC STABILITY: INCOME INSECURITY: HOW HARD IS IT FOR YOU TO PAY FOR THE VERY BASICS LIKE FOOD, HOUSING, MEDICAL CARE, AND HEATING?: NOT HARD AT ALL

## 2021-06-23 SDOH — HEALTH STABILITY: MENTAL HEALTH
STRESS IS WHEN SOMEONE FEELS TENSE, NERVOUS, ANXIOUS, OR CAN'T SLEEP AT NIGHT BECAUSE THEIR MIND IS TROUBLED. HOW STRESSED ARE YOU?: NOT AT ALL

## 2021-06-23 SDOH — ECONOMIC STABILITY: INCOME INSECURITY: IN THE LAST 12 MONTHS, WAS THERE A TIME WHEN YOU WERE NOT ABLE TO PAY THE MORTGAGE OR RENT ON TIME?: NO

## 2021-06-23 SDOH — HEALTH STABILITY: PHYSICAL HEALTH: ON AVERAGE, HOW MANY MINUTES DO YOU ENGAGE IN EXERCISE AT THIS LEVEL?: 10 MIN

## 2021-06-23 SDOH — ECONOMIC STABILITY: TRANSPORTATION INSECURITY
IN THE PAST 12 MONTHS, HAS LACK OF RELIABLE TRANSPORTATION KEPT YOU FROM MEDICAL APPOINTMENTS, MEETINGS, WORK OR FROM GETTING THINGS NEEDED FOR DAILY LIVING?: NO

## 2021-06-23 ASSESSMENT — SOCIAL DETERMINANTS OF HEALTH (SDOH)
HOW OFTEN DO YOU GET TOGETHER WITH FRIENDS OR RELATIVES?: NEVER
WITHIN THE PAST 12 MONTHS, YOU WORRIED THAT YOUR FOOD WOULD RUN OUT BEFORE YOU GOT THE MONEY TO BUY MORE: NEVER TRUE
HOW OFTEN DO YOU ATTENT MEETINGS OF THE CLUB OR ORGANIZATION YOU BELONG TO?: NEVER
HOW OFTEN DO YOU ATTEND CHURCH OR RELIGIOUS SERVICES?: NEVER
IN A TYPICAL WEEK, HOW MANY TIMES DO YOU TALK ON THE PHONE WITH FAMILY, FRIENDS, OR NEIGHBORS?: MORE THAN THREE TIMES A WEEK
HOW OFTEN DO YOU HAVE SIX OR MORE DRINKS ON ONE OCCASION: NEVER
HOW HARD IS IT FOR YOU TO PAY FOR THE VERY BASICS LIKE FOOD, HOUSING, MEDICAL CARE, AND HEATING?: NOT HARD AT ALL
HOW OFTEN DO YOU ATTENT MEETINGS OF THE CLUB OR ORGANIZATION YOU BELONG TO?: NEVER
IN A TYPICAL WEEK, HOW MANY TIMES DO YOU TALK ON THE PHONE WITH FAMILY, FRIENDS, OR NEIGHBORS?: MORE THAN THREE TIMES A WEEK
DO YOU BELONG TO ANY CLUBS OR ORGANIZATIONS SUCH AS CHURCH GROUPS UNIONS, FRATERNAL OR ATHLETIC GROUPS, OR SCHOOL GROUPS?: NO
HOW OFTEN DO YOU GET TOGETHER WITH FRIENDS OR RELATIVES?: NEVER
HOW OFTEN DO YOU ATTEND CHURCH OR RELIGIOUS SERVICES?: NEVER
HOW MANY DRINKS CONTAINING ALCOHOL DO YOU HAVE ON A TYPICAL DAY WHEN YOU ARE DRINKING: 1 OR 2
DO YOU BELONG TO ANY CLUBS OR ORGANIZATIONS SUCH AS CHURCH GROUPS UNIONS, FRATERNAL OR ATHLETIC GROUPS, OR SCHOOL GROUPS?: NO

## 2021-06-23 ASSESSMENT — LIFESTYLE VARIABLES
HOW MANY STANDARD DRINKS CONTAINING ALCOHOL DO YOU HAVE ON A TYPICAL DAY: 1 OR 2
HOW OFTEN DO YOU HAVE SIX OR MORE DRINKS ON ONE OCCASION: NEVER

## 2021-06-23 NOTE — TELEPHONE ENCOUNTER
----- Message from Leslie Bay P.A.-C. sent at 6/23/2021  8:31 AM PDT -----  Please call patient about their results.     There are a few things on their results I'd like to talk about. Please schedule a follow up.     Thank you,    Leslie Bay P.A.-C.

## 2021-06-24 ENCOUNTER — OFFICE VISIT (OUTPATIENT)
Dept: MEDICAL GROUP | Facility: PHYSICIAN GROUP | Age: 66
End: 2021-06-24
Payer: MEDICARE

## 2021-06-24 ENCOUNTER — HOSPITAL ENCOUNTER (OUTPATIENT)
Dept: LAB | Facility: MEDICAL CENTER | Age: 66
End: 2021-06-24
Attending: PHYSICIAN ASSISTANT
Payer: MEDICARE

## 2021-06-24 VITALS
RESPIRATION RATE: 12 BRPM | BODY MASS INDEX: 30.21 KG/M2 | HEIGHT: 70 IN | WEIGHT: 211 LBS | DIASTOLIC BLOOD PRESSURE: 86 MMHG | SYSTOLIC BLOOD PRESSURE: 128 MMHG | TEMPERATURE: 97.3 F | OXYGEN SATURATION: 98 % | HEART RATE: 87 BPM

## 2021-06-24 DIAGNOSIS — R13.19 OTHER DYSPHAGIA: ICD-10-CM

## 2021-06-24 DIAGNOSIS — Z12.31 BREAST CANCER SCREENING BY MAMMOGRAM: ICD-10-CM

## 2021-06-24 DIAGNOSIS — D58.2 ELEVATED HEMOGLOBIN (HCC): ICD-10-CM

## 2021-06-24 DIAGNOSIS — R79.89 ABNORMAL CBC: ICD-10-CM

## 2021-06-24 DIAGNOSIS — E78.5 DYSLIPIDEMIA: ICD-10-CM

## 2021-06-24 DIAGNOSIS — Z12.31 ENCOUNTER FOR SCREENING MAMMOGRAM FOR BREAST CANCER: ICD-10-CM

## 2021-06-24 LAB
FERRITIN SERPL-MCNC: 451 NG/ML (ref 10–291)
IRON SATN MFR SERPL: 34 % (ref 15–55)
IRON SERPL-MCNC: 95 UG/DL (ref 40–170)
TIBC SERPL-MCNC: 283 UG/DL (ref 250–450)
UIBC SERPL-MCNC: 188 UG/DL (ref 110–370)

## 2021-06-24 PROCEDURE — 99214 OFFICE O/P EST MOD 30 MIN: CPT | Performed by: PHYSICIAN ASSISTANT

## 2021-06-24 PROCEDURE — 82728 ASSAY OF FERRITIN: CPT

## 2021-06-24 PROCEDURE — 83550 IRON BINDING TEST: CPT

## 2021-06-24 PROCEDURE — 36415 COLL VENOUS BLD VENIPUNCTURE: CPT

## 2021-06-24 PROCEDURE — 83540 ASSAY OF IRON: CPT

## 2021-06-24 RX ORDER — OMEPRAZOLE 20 MG/1
20 CAPSULE, DELAYED RELEASE ORAL DAILY
Qty: 30 CAPSULE | Refills: 2 | Status: SHIPPED | OUTPATIENT
Start: 2021-06-24 | End: 2021-10-04

## 2021-06-24 ASSESSMENT — FIBROSIS 4 INDEX: FIB4 SCORE: 1.22

## 2021-06-24 ASSESSMENT — PATIENT HEALTH QUESTIONNAIRE - PHQ9: CLINICAL INTERPRETATION OF PHQ2 SCORE: 0

## 2021-06-24 NOTE — ASSESSMENT & PLAN NOTE
Difficulty swallowing last several months. Occurs with food. She tries to eats small bites and this does help. Occasionally epigastric pain after she eats. Sometimes her food will come back up and it will burn. This has occurred 2-3 times in last 6 months. No bloating, diarrhea or nausea.

## 2021-06-24 NOTE — PROGRESS NOTES
CC:   Chief Complaint   Patient presents with   • Lab Results   • Dyslipidemia          HISTORY OF PRESENT ILLNESS: Patient is a 65 y.o. female established patient who presents today to follow up on the following conditions:       Health Maintenance: Completed  mammo ordered, holding off on vaccines for now.     Abnormal CBC        Her  tell her that her snore, but not every night. No gasping for breath at night. She does not supplement iron, discussed getting labs as well.     Dyslipidemia  This is a chronic condition.   Latest Labs:   Lab Results   Component Value Date/Time    CHOLSTRLTOT 217 (H) 06/22/2021 10:22 AM     (H) 06/22/2021 10:22 AM    HDL 60 06/22/2021 10:22 AM    TRIGLYCERIDE 145 06/22/2021 10:22 AM      Medications: none, discussed medication vs. Lifestyle modifications today.     Risk calculator: The 10-year ASCVD risk score (Puneetisidro SCHMITT Jr., et al., 2013) is: 6.9%       Other dysphagia  Difficulty swallowing last several months. Occurs with food. She tries to eats small bites and this does help. Occasionally epigastric pain after she eats. Sometimes her food will come back up and it will burn. This has occurred 2-3 times in last 6 months. No bloating, diarrhea or nausea.       Patient Active Problem List    Diagnosis Date Noted   • Abnormal CBC 06/24/2021   • Other dysphagia 06/24/2021   • Dry throat 12/30/2020   • Dyslipidemia 12/30/2020   • Vitamin D deficiency 12/30/2020   • Screening for colon cancer 03/15/2020   • Encounter for screening mammogram for breast cancer 03/15/2020   • Postmenopausal 03/15/2020   • Screening for colorectal cancer 04/16/2019   • Elevated glucose 04/16/2019   • BMI 31.0-31.9,adult 01/25/2018   • Need for vaccination 01/25/2018   • Unspecified glaucoma 02/07/2006      Allergies:Patient has no known allergies.    Current Outpatient Medications   Medication Sig Dispense Refill   • omeprazole (PRILOSEC) 20 MG delayed-release capsule Take 1 capsule by mouth every  "day. 30 capsule 2   • latanoprost (XALATAN) 0.005 % Solution Place 1 Drop in both eyes every evening.       No current facility-administered medications for this visit.       Social History     Tobacco Use   • Smoking status: Never Smoker   • Smokeless tobacco: Never Used   Vaping Use   • Vaping Use: Never used   Substance Use Topics   • Alcohol use: No   • Drug use: No     Social History     Social History Narrative   • Not on file       Family History   Problem Relation Age of Onset   • Heart Attack Mother 76   • Kidney Disease Mother    • Lung Cancer Father 56   • Alcohol/Drug Father         Alcoholic   • Other Sister         Low BP   • Other Brother         Back and joint issues   • Lung Disease Maternal Grandmother    • No Known Problems Maternal Grandfather    • No Known Problems Paternal Grandmother    • Other Paternal Grandfather         Suicide   • Alcohol/Drug Paternal Grandfather    • Hypertension Sister    • No Known Problems Son    • No Known Problems Son        Review of Systems:    Constitutional: No Fevers, Chills  Eyes: No vision changes  ENT: No hearing changes  Resp: No Shortness of breath  CV: No Chest pain  GI: No Nausea/Vomiting  MSK: No weakness  Skin: No rashes  Neuro: No Headaches  Psych: No Suicidal ideations    All remaining systems reviewed and found to be negative, except as stated above.    Exam:    /86   Pulse 87   Temp 36.3 °C (97.3 °F) (Temporal)   Resp 12   Ht 1.778 m (5' 10\")   Wt 95.7 kg (211 lb)   SpO2 98%  Body mass index is 30.28 kg/m².    General:  Well nourished, well developed female in NAD  HENT: Atraumatic, normocephalic  EYES: Extraocular movements intact  NECK: Supple, FROM, no obvious thyromegaly or nodules. No lymphadenopathy on exam.   CHEST: No deformities, Equal chest expansion  RESP: Unlabored, no stridor or audible wheeze  HEART: Regular Rate and rhythm.   Extremities: No Clubbing, Cyanosis, or Edema  Skin: Warm/dry, without rashes  Neuro: A/O x 4, due " to COVID-19- did not have patient remove face mask to test cranial nerves.  Motor/sensory grossly intact  Psych: Normal behavior, normal affect      Lab review:  Labs are reviewed and discussed with a patient  Lab Results   Component Value Date/Time    CHOLSTRLTOT 217 (H) 06/22/2021 10:22 AM     (H) 06/22/2021 10:22 AM    HDL 60 06/22/2021 10:22 AM    TRIGLYCERIDE 145 06/22/2021 10:22 AM       Lab Results   Component Value Date/Time    SODIUM 142 06/22/2021 10:22 AM    POTASSIUM 4.3 06/22/2021 10:22 AM    CHLORIDE 104 06/22/2021 10:22 AM    CO2 25 06/22/2021 10:22 AM    GLUCOSE 90 06/22/2021 10:22 AM    BUN 18 06/22/2021 10:22 AM    CREATININE 0.82 06/22/2021 10:22 AM     Lab Results   Component Value Date/Time    ALKPHOSPHAT 80 06/22/2021 10:22 AM    ASTSGOT 16 06/22/2021 10:22 AM    ALTSGPT 12 06/22/2021 10:22 AM    TBILIRUBIN 1.0 06/22/2021 10:22 AM      Lab Results   Component Value Date/Time    HBA1C 5.6 06/22/2021 10:22 AM    HBA1C 5.9 (H) 07/17/2019 08:04 AM     No results found for: TSH  No results found for: FREET4    Lab Results   Component Value Date/Time    WBC 5.3 06/22/2021 10:22 AM    RBC 5.47 (H) 06/22/2021 10:22 AM    HEMOGLOBIN 17.2 (H) 06/22/2021 10:22 AM    HEMATOCRIT 52.1 (H) 06/22/2021 10:22 AM    MCV 95.2 06/22/2021 10:22 AM    MCH 31.4 06/22/2021 10:22 AM    MCHC 33.0 (L) 06/22/2021 10:22 AM    MPV 10.0 06/22/2021 10:22 AM    NEUTSPOLYS 62.10 06/22/2021 10:22 AM    LYMPHOCYTES 28.20 06/22/2021 10:22 AM    MONOCYTES 6.30 06/22/2021 10:22 AM    EOSINOPHILS 2.10 06/22/2021 10:22 AM    BASOPHILS 0.90 06/22/2021 10:22 AM          Assessment/Plan:  1. Abnormal CBC  - IRON/TOTAL IRON BIND; Future  - FERRITIN; Future  Screen patient for sleep apnea, she does snore at night.  I do recommend we do a overnight pulse ox study on the patient now to rule this out.  In addition going to get an iron panel for chronically elevated hemoglobin hematocrit.  I do recommend the patient start a baby aspirin  now.  In addition discussed donating blood.  Would like to follow-up on this matter in 4 weeks.  She is not supplementing iron currently.  2. Dyslipidemia  Chronic condition, discussed lifestyle modifications.  Recommend she reduce red meats and cheese in her diet repeat labs in 3 months.  3. Other dysphagia  - REFERRAL TO GASTROENTEROLOGY  New complaint discussed today.  Suspect patient may have esophageal dysmotility issue in addition to some atypical reflux.  Going to trial omeprazole once a day to start to see if this improves some of her symptoms.  In addition referred to gastroenterology for further consult.  4. Breast cancer screening by mammogram    5. Encounter for screening mammogram for breast cancer  - MA-SCREENING MAMMO BILAT W/TOMOSYNTHESIS W/CAD; Future    6. Elevated hemoglobin (HCC)  - IRON/TOTAL IRON BIND; Future  - FERRITIN; Future  - Nocturnal Oximetry Study  Starting patient on a baby aspirin, getting iron panel now.  Recommend blood donation.  Other orders  - omeprazole (PRILOSEC) 20 MG delayed-release capsule; Take 1 capsule by mouth every day.  Dispense: 30 capsule; Refill: 2       Follow-up: Return in about 4 weeks (around 7/22/2021) for Follow up on labs and sleep study .    Please note that this dictation was created using voice recognition software. I have made every reasonable attempt to correct obvious errors, but I expect that there are errors of grammar and possibly content that I did not discover before finalizing the note.

## 2021-06-24 NOTE — ASSESSMENT & PLAN NOTE
This is a chronic condition.   Latest Labs:   Lab Results   Component Value Date/Time    CHOLSTRLTOT 217 (H) 06/22/2021 10:22 AM     (H) 06/22/2021 10:22 AM    HDL 60 06/22/2021 10:22 AM    TRIGLYCERIDE 145 06/22/2021 10:22 AM      Medications: none, discussed medication vs. Lifestyle modifications today.     Risk calculator: The 10-year ASCVD risk score (Puneetisidro SCHMITT Jr., et al., 2013) is: 6.9%

## 2021-06-24 NOTE — ASSESSMENT & PLAN NOTE
Her  tell her that her snore, but not every night. No gasping for breath at night. She does not supplement iron, discussed getting labs as well.

## 2021-06-25 ENCOUNTER — TELEPHONE (OUTPATIENT)
Dept: MEDICAL GROUP | Facility: PHYSICIAN GROUP | Age: 66
End: 2021-06-25

## 2021-07-27 ENCOUNTER — TELEPHONE (OUTPATIENT)
Dept: SLEEP MEDICINE | Facility: MEDICAL CENTER | Age: 66
End: 2021-07-27

## 2021-07-27 NOTE — TELEPHONE ENCOUNTER
PT LVM regarding OPO ordered by PCP. It looks like the referral was sent to pulmonary rehab. I have reached out to patient and advised our next available is the end of august. PT did leave message that she wanted it before her follow up with PCP but we are booked full. They could send to [employer] to see if that can be scheduled sooner.

## 2021-08-04 ENCOUNTER — HOSPITAL ENCOUNTER (OUTPATIENT)
Dept: RADIOLOGY | Facility: MEDICAL CENTER | Age: 66
End: 2021-08-04
Attending: PHYSICIAN ASSISTANT
Payer: MEDICARE

## 2021-08-04 DIAGNOSIS — Z12.31 ENCOUNTER FOR SCREENING MAMMOGRAM FOR BREAST CANCER: ICD-10-CM

## 2021-08-04 PROCEDURE — 77063 BREAST TOMOSYNTHESIS BI: CPT

## 2021-08-05 ENCOUNTER — TELEPHONE (OUTPATIENT)
Dept: MEDICAL GROUP | Facility: PHYSICIAN GROUP | Age: 66
End: 2021-08-05

## 2021-08-05 NOTE — TELEPHONE ENCOUNTER
----- Message from Leslie Bay P.A.-C. sent at 8/5/2021  9:36 AM PDT -----  Please call patient about their results.     Results showed: Your recent mammogram has been reviewed and has normal findings, recommend we repeat the mammogram in 1 year.  We will discuss in detail at your next follow-up visit.    Thank you,    Leslie Bay PA-C

## 2021-08-06 ENCOUNTER — HOSPITAL ENCOUNTER (OUTPATIENT)
Dept: LAB | Facility: MEDICAL CENTER | Age: 66
End: 2021-08-06
Attending: PHYSICIAN ASSISTANT
Payer: MEDICARE

## 2021-08-06 ENCOUNTER — OFFICE VISIT (OUTPATIENT)
Dept: MEDICAL GROUP | Facility: PHYSICIAN GROUP | Age: 66
End: 2021-08-06
Payer: MEDICARE

## 2021-08-06 VITALS
HEART RATE: 75 BPM | TEMPERATURE: 98 F | SYSTOLIC BLOOD PRESSURE: 134 MMHG | OXYGEN SATURATION: 98 % | BODY MASS INDEX: 30.24 KG/M2 | DIASTOLIC BLOOD PRESSURE: 86 MMHG | WEIGHT: 211.2 LBS | RESPIRATION RATE: 16 BRPM | HEIGHT: 70 IN

## 2021-08-06 DIAGNOSIS — R79.89 ABNORMAL CBC: ICD-10-CM

## 2021-08-06 DIAGNOSIS — Z23 NEED FOR VACCINATION: ICD-10-CM

## 2021-08-06 LAB
CRP SERPL HS-MCNC: 0.44 MG/DL (ref 0–0.75)
ERYTHROCYTE [SEDIMENTATION RATE] IN BLOOD BY WESTERGREN METHOD: 8 MM/HOUR (ref 0–25)
HAV IGM SERPL QL IA: NORMAL
HBV CORE IGM SER QL: NORMAL
HBV SURFACE AG SER QL: NORMAL
HCV AB SER QL: NORMAL
HIV 1+2 AB+HIV1 P24 AG SERPL QL IA: NORMAL

## 2021-08-06 PROCEDURE — 90732 PPSV23 VACC 2 YRS+ SUBQ/IM: CPT | Performed by: PHYSICIAN ASSISTANT

## 2021-08-06 PROCEDURE — 86140 C-REACTIVE PROTEIN: CPT

## 2021-08-06 PROCEDURE — G0475 HIV COMBINATION ASSAY: HCPCS | Mod: GA

## 2021-08-06 PROCEDURE — 80074 ACUTE HEPATITIS PANEL: CPT | Mod: GA

## 2021-08-06 PROCEDURE — G0009 ADMIN PNEUMOCOCCAL VACCINE: HCPCS | Performed by: PHYSICIAN ASSISTANT

## 2021-08-06 PROCEDURE — 82105 ALPHA-FETOPROTEIN SERUM: CPT | Mod: GA

## 2021-08-06 PROCEDURE — 81256 HFE GENE: CPT

## 2021-08-06 PROCEDURE — 88185 FLOWCYTOMETRY/TC ADD-ON: CPT | Mod: 91

## 2021-08-06 PROCEDURE — 88184 FLOWCYTOMETRY/ TC 1 MARKER: CPT

## 2021-08-06 PROCEDURE — 99213 OFFICE O/P EST LOW 20 MIN: CPT | Mod: 25 | Performed by: PHYSICIAN ASSISTANT

## 2021-08-06 PROCEDURE — 83516 IMMUNOASSAY NONANTIBODY: CPT

## 2021-08-06 PROCEDURE — 85652 RBC SED RATE AUTOMATED: CPT

## 2021-08-06 PROCEDURE — 86038 ANTINUCLEAR ANTIBODIES: CPT

## 2021-08-06 PROCEDURE — 36415 COLL VENOUS BLD VENIPUNCTURE: CPT

## 2021-08-06 ASSESSMENT — FIBROSIS 4 INDEX: FIB4 SCORE: 1.22

## 2021-08-06 NOTE — ASSESSMENT & PLAN NOTE
Patient's last visit was June 24, 2021.  At that time we discussed possible iron deficiency according to her CBC.  Also discussed potential for sleep apnea.  Proceeded with getting iron panel on the patient ferritin is elevated at 451, iron saturation is normal at 34, total iron at 95.  Normal TIBC.  Did recommend patient start a baby aspirin her last visit.  Patient is not supplementing iron.  Patient does not drink alcohol.  also discussed doing blood donation for elevated levels.  Patient could have aspects of hereditary hemochromatosis, discussed further work-up with patient today.    Patient did get donate blood since our last visit.     She is on baby aspirin.     She has sleep study scheduled.

## 2021-08-06 NOTE — PROGRESS NOTES
CC:   Chief Complaint   Patient presents with   • Results     lab, mammogram          HISTORY OF PRESENT ILLNESS: Patient is a 65 y.o. female established patient who presents today to follow up on the following conditions:     Abnormal CBC  Patient's last visit was June 24, 2021.  At that time we discussed possible iron deficiency according to her CBC.  Also discussed potential for sleep apnea.  Proceeded with getting iron panel on the patient ferritin is elevated at 451, iron saturation is normal at 34, total iron at 95.  Normal TIBC.  Did recommend patient start a baby aspirin her last visit.  Patient is not supplementing iron.  Patient does not drink alcohol.  also discussed doing blood donation for elevated levels.  Patient could have aspects of hereditary hemochromatosis, discussed further work-up with patient today.    Patient did get donate blood since our last visit.     She is on baby aspirin.     She has sleep study scheduled.       Patient Active Problem List    Diagnosis Date Noted   • Abnormal CBC 06/24/2021   • Other dysphagia 06/24/2021   • Dry throat 12/30/2020   • Dyslipidemia 12/30/2020   • Vitamin D deficiency 12/30/2020   • Screening for colon cancer 03/15/2020   • Encounter for screening mammogram for breast cancer 03/15/2020   • Postmenopausal 03/15/2020   • Screening for colorectal cancer 04/16/2019   • Elevated glucose 04/16/2019   • BMI 31.0-31.9,adult 01/25/2018   • Need for vaccination 01/25/2018   • Unspecified glaucoma 02/07/2006      Allergies:Patient has no known allergies.    Current Outpatient Medications   Medication Sig Dispense Refill   • ASPIRIN 81 PO Take 1 tablet by mouth 2 (two) times a day.     • Calcium Carb-Cholecalciferol (CALCIUM + VITAMIN D3 PO) Take  by mouth.     • Zoster Vac Recomb Adjuvanted (SHINGRIX) 50 MCG/0.5ML Recon Susp Inject 0.5 mL into the shoulder, thigh, or buttocks one time for 1 dose. 0.5 mL 0   • omeprazole (PRILOSEC) 20 MG delayed-release capsule Take  "1 capsule by mouth every day. 30 capsule 2   • latanoprost (XALATAN) 0.005 % Solution Place 1 Drop in both eyes every evening.       No current facility-administered medications for this visit.       Social History     Tobacco Use   • Smoking status: Never Smoker   • Smokeless tobacco: Never Used   Vaping Use   • Vaping Use: Never used   Substance Use Topics   • Alcohol use: No   • Drug use: No     Social History     Social History Narrative   • Not on file       Family History   Problem Relation Age of Onset   • Heart Attack Mother 76   • Kidney Disease Mother    • Lung Cancer Father 56   • Alcohol/Drug Father         Alcoholic   • Other Sister         Low BP   • Other Brother         Back and joint issues   • Lung Disease Maternal Grandmother    • No Known Problems Maternal Grandfather    • No Known Problems Paternal Grandmother    • Other Paternal Grandfather         Suicide   • Alcohol/Drug Paternal Grandfather    • Hypertension Sister    • No Known Problems Son    • No Known Problems Son        Review of Systems:    Constitutional: No Fevers, Chills  Eyes: No vision changes  ENT: No hearing changes  Resp: No Shortness of breath  CV: No Chest pain  GI: No Nausea/Vomiting  MSK: No weakness  Skin: No rashes  Neuro: No Headaches  Psych: No Suicidal ideations    All remaining systems reviewed and found to be negative, except as stated above.    Exam:    /86 (BP Location: Left arm, Patient Position: Sitting, BP Cuff Size: Large adult)   Pulse 75   Temp 36.7 °C (98 °F) (Temporal)   Resp 16   Ht 1.778 m (5' 10\")   Wt 95.8 kg (211 lb 3.2 oz)   SpO2 98%  Body mass index is 30.3 kg/m².    General:  Well nourished, well developed female in NAD  HENT: Atraumatic, normocephalic  EYES: Extraocular movements intact  NECK: Supple, FROM  CHEST: No deformities, Equal chest expansion  RESP: Unlabored, no stridor or audible wheeze  HEART: Regular Rate and rhythm.   Extremities: No Clubbing, Cyanosis, or Edema  Skin: " Warm/dry, without rashes  Neuro: A/O x 4, due to COVID-19- did not have patient remove face mask to test cranial nerves.  Motor/sensory grossly intact  Psych: Normal behavior, normal affect      Lab review:  Labs are reviewed and discussed with a patient  Lab Results   Component Value Date/Time    CHOLSTRLTOT 217 (H) 06/22/2021 10:22 AM     (H) 06/22/2021 10:22 AM    HDL 60 06/22/2021 10:22 AM    TRIGLYCERIDE 145 06/22/2021 10:22 AM       Lab Results   Component Value Date/Time    SODIUM 142 06/22/2021 10:22 AM    POTASSIUM 4.3 06/22/2021 10:22 AM    CHLORIDE 104 06/22/2021 10:22 AM    CO2 25 06/22/2021 10:22 AM    GLUCOSE 90 06/22/2021 10:22 AM    BUN 18 06/22/2021 10:22 AM    CREATININE 0.82 06/22/2021 10:22 AM     Lab Results   Component Value Date/Time    ALKPHOSPHAT 80 06/22/2021 10:22 AM    ASTSGOT 16 06/22/2021 10:22 AM    ALTSGPT 12 06/22/2021 10:22 AM    TBILIRUBIN 1.0 06/22/2021 10:22 AM      Lab Results   Component Value Date/Time    HBA1C 5.6 06/22/2021 10:22 AM    HBA1C 5.9 (H) 07/17/2019 08:04 AM     No results found for: TSH  No results found for: FREET4    Lab Results   Component Value Date/Time    WBC 5.3 06/22/2021 10:22 AM    RBC 5.47 (H) 06/22/2021 10:22 AM    HEMOGLOBIN 17.2 (H) 06/22/2021 10:22 AM    HEMATOCRIT 52.1 (H) 06/22/2021 10:22 AM    MCV 95.2 06/22/2021 10:22 AM    MCH 31.4 06/22/2021 10:22 AM    MCHC 33.0 (L) 06/22/2021 10:22 AM    MPV 10.0 06/22/2021 10:22 AM    NEUTSPOLYS 62.10 06/22/2021 10:22 AM    LYMPHOCYTES 28.20 06/22/2021 10:22 AM    MONOCYTES 6.30 06/22/2021 10:22 AM    EOSINOPHILS 2.10 06/22/2021 10:22 AM    BASOPHILS 0.90 06/22/2021 10:22 AM          Assessment/Plan:  1. Abnormal CBC  - AFP SERUM TUMOR MARKER; Future  - ALEXANDRE REFLEXIVE PROFILE; Future  - CRP QUANTITIVE (NON-CARDIAC); Future  - HEPATITIS PANEL ACUTE(4 COMPONENTS); Future  - HEREDITARY HEMOCHROMOTOSIS; Future  - HIV AG/AB COMBO ASSAY SCREENING; Future  - LEUK/LYMPH PHENOTYPING, FLOW CYTOMETRY; Future  -  Sed Rate; Future  - T-TG IGG; Future  - T-TRANSGLUTAMINASE (TTG) IGA; Future  - US-ABDOMEN COMPLETE SURVEY; Future  Elevated red blood cells, hemoglobin hematocrit in addition to ferritin level.  Do recommend further work-up at this time.  Patient denies any red flag symptoms such as unintentional weight loss, night sweats, fever, chills, nausea, abdominal pain.  Patient is scheduled to get a sleep study at the end of the month.  She has donated blood, and has started a baby aspirin.  Going to rule out an inflammatory, infectious or cell production issue causing the elevation of the ferritin.  In addition we will proceed with evaluation of her liver.      2. Need for vaccination  - Zoster Vac Recomb Adjuvanted (SHINGRIX) 50 MCG/0.5ML Recon Susp; Inject 0.5 mL into the shoulder, thigh, or buttocks one time for 1 dose.  Dispense: 0.5 mL; Refill: 0  - Pneumovax Vaccine (PPSV23)    Other orders  - ASPIRIN 81 PO; Take 1 tablet by mouth 2 (two) times a day.  - Calcium Carb-Cholecalciferol (CALCIUM + VITAMIN D3 PO); Take  by mouth.       Follow-up: Return in about 4 weeks (around 9/3/2021) for Follow up on labs, sleep study, and imaging.    Please note that this dictation was created using voice recognition software. I have made every reasonable attempt to correct obvious errors, but I expect that there are errors of grammar and possibly content that I did not discover before finalizing the note.

## 2021-08-08 LAB
ACUTE LEUKEMIA MARKERS SPEC-IMP: NORMAL
EVENTS COUNTED SPEC: 26 MARKERS
SOURCE 9121: NORMAL

## 2021-08-09 LAB
AFP-TM SERPL-MCNC: 4 NG/ML (ref 0–9)
NUCLEAR IGG SER QL IA: NORMAL
TTG IGA SER IA-ACNC: <2 U/ML (ref 0–3)
TTG IGG SER IA-ACNC: 3 U/ML (ref 0–5)

## 2021-08-13 LAB
HFE GENE MUT ANL BLD/T: NORMAL
HFE P.C282Y BLD/T QL: NEGATIVE
HFE P.H63D BLD/T QL: NORMAL
HFE P.S65C BLD/T QL: NEGATIVE

## 2021-08-20 ENCOUNTER — HOME STUDY (OUTPATIENT)
Dept: SLEEP MEDICINE | Facility: MEDICAL CENTER | Age: 66
End: 2021-08-20
Attending: PHYSICIAN ASSISTANT
Payer: MEDICARE

## 2021-08-20 DIAGNOSIS — D58.2 ELEVATED HEMOGLOBIN (HCC): ICD-10-CM

## 2021-08-23 PROCEDURE — 94762 N-INVAS EAR/PLS OXIMTRY CONT: CPT | Performed by: FAMILY MEDICINE

## 2021-08-24 ENCOUNTER — TELEPHONE (OUTPATIENT)
Dept: MEDICAL GROUP | Facility: PHYSICIAN GROUP | Age: 66
End: 2021-08-24

## 2021-08-24 NOTE — PROCEDURES
Over Night Pulse Oximetry     Impression:   The study was done on room air. The total analyzed time was 8 hrs 14 min. O2 Sat. caitlin was 86% and mean O2 sat was 89 % and baseline O2 at 991%. O2 sat was below 88% for 1.3 min of the flow evaluation time. Oxygen Desaturation (>=3%) Index was 3.8/hr.      Recommendation:  It was an unremarkable OPO. Clinical correlation recommended.

## 2021-08-24 NOTE — TELEPHONE ENCOUNTER
----- Message from Leslie Bay P.A.-C. sent at 8/24/2021 12:39 PM PDT -----  Please call patient about their results.     Results showed: Sleep study was unremarkable, will discuss at her follow-up visit in detail.    Thank you,    Leslie Bay PA-C

## 2021-09-08 ENCOUNTER — APPOINTMENT (OUTPATIENT)
Dept: RADIOLOGY | Facility: MEDICAL CENTER | Age: 66
End: 2021-09-08
Attending: PHYSICIAN ASSISTANT
Payer: MEDICARE

## 2021-09-08 DIAGNOSIS — R79.89 ABNORMAL CBC: ICD-10-CM

## 2021-09-08 PROCEDURE — 76700 US EXAM ABDOM COMPLETE: CPT

## 2021-09-13 ENCOUNTER — OFFICE VISIT (OUTPATIENT)
Dept: MEDICAL GROUP | Facility: PHYSICIAN GROUP | Age: 66
End: 2021-09-13
Payer: MEDICARE

## 2021-09-13 VITALS
TEMPERATURE: 98.1 F | SYSTOLIC BLOOD PRESSURE: 120 MMHG | HEART RATE: 86 BPM | RESPIRATION RATE: 14 BRPM | DIASTOLIC BLOOD PRESSURE: 82 MMHG | HEIGHT: 70 IN | OXYGEN SATURATION: 96 % | BODY MASS INDEX: 30.49 KG/M2 | WEIGHT: 213 LBS

## 2021-09-13 DIAGNOSIS — R79.89 ELEVATED FERRITIN: ICD-10-CM

## 2021-09-13 DIAGNOSIS — D58.2 ELEVATED HEMOGLOBIN (HCC): ICD-10-CM

## 2021-09-13 DIAGNOSIS — R71.8 ELEVATED HEMATOCRIT: ICD-10-CM

## 2021-09-13 DIAGNOSIS — R13.10 DYSPHAGIA, UNSPECIFIED TYPE: ICD-10-CM

## 2021-09-13 DIAGNOSIS — R79.89 ABNORMAL CBC: ICD-10-CM

## 2021-09-13 PROCEDURE — 99213 OFFICE O/P EST LOW 20 MIN: CPT | Performed by: PHYSICIAN ASSISTANT

## 2021-09-13 ASSESSMENT — FIBROSIS 4 INDEX: FIB4 SCORE: 1.22

## 2021-09-13 NOTE — ASSESSMENT & PLAN NOTE
At her last visit we did proceed with a further work-up for elevated alkaline phosphatase in addition to an elevation in hemoglobin hematocrit.  Patient is found to be heterozygous for hereditary hemochromatosis, ferritin is elevated at 451.0 as well.  Patient had consult with Dr. Bai on August 20, 2021 for overnight pulse ox.  He stated that her sleep study was unremarkable.    She donated blood about 3 months ago, will do again in October.

## 2021-09-13 NOTE — PROGRESS NOTES
CC:   Chief Complaint   Patient presents with   • Follow-Up     Sleep Study          HISTORY OF PRESENT ILLNESS: Patient is a 65 y.o. female established patient who presents today to follow up on the following conditions:       Health Maintenance: Completed    Abnormal CBC  At her last visit we did proceed with a further work-up for elevated alkaline phosphatase in addition to an elevation in hemoglobin hematocrit.  Patient is found to be heterozygous for hereditary hemochromatosis, ferritin is elevated at 451.0 as well.  Patient had consult with Dr. Bai on August 20, 2021 for overnight pulse ox.  He stated that her sleep study was unremarkable.    She donated blood about 3 months ago, will do again in October.     Dysphagia  Went to [facility] in August- diagnosed with EGD- that she had schatzki's ring and had stretched.       Patient Active Problem List    Diagnosis Date Noted   • Abnormal CBC 06/24/2021   • Dysphagia 06/24/2021   • Dry throat 12/30/2020   • Dyslipidemia 12/30/2020   • Vitamin D deficiency 12/30/2020   • Screening for colon cancer 03/15/2020   • Encounter for screening mammogram for breast cancer 03/15/2020   • Postmenopausal 03/15/2020   • Screening for colorectal cancer 04/16/2019   • Elevated glucose 04/16/2019   • BMI 31.0-31.9,adult 01/25/2018   • Need for vaccination 01/25/2018   • Unspecified glaucoma 02/07/2006      Allergies:Patient has no known allergies.    Current Outpatient Medications   Medication Sig Dispense Refill   • ASPIRIN 81 PO Take 1 tablet by mouth 2 (two) times a day.     • Calcium Carb-Cholecalciferol (CALCIUM + VITAMIN D3 PO) Take  by mouth.     • omeprazole (PRILOSEC) 20 MG delayed-release capsule Take 1 capsule by mouth every day. 30 capsule 2   • latanoprost (XALATAN) 0.005 % Solution Place 1 Drop in both eyes every evening.       No current facility-administered medications for this visit.       Social History     Tobacco Use   • Smoking status: Never Smoker   • Smokeless  "tobacco: Never Used   Vaping Use   • Vaping Use: Never used   Substance Use Topics   • Alcohol use: No   • Drug use: No     Social History     Social History Narrative   • Not on file       Family History   Problem Relation Age of Onset   • Heart Attack Mother 76   • Kidney Disease Mother    • Lung Cancer Father 56   • Alcohol/Drug Father         Alcoholic   • Other Sister         Low BP   • Other Brother         Back and joint issues   • Lung Disease Maternal Grandmother    • No Known Problems Maternal Grandfather    • No Known Problems Paternal Grandmother    • Other Paternal Grandfather         Suicide   • Alcohol/Drug Paternal Grandfather    • Hypertension Sister    • No Known Problems Son    • No Known Problems Son        Review of Systems:    Constitutional: No Fevers, Chills  Eyes: No vision changes  ENT: No hearing changes  Resp: No Shortness of breath  CV: No Chest pain  GI: No Nausea/Vomiting  MSK: No weakness  Skin: No rashes  Neuro: No Headaches  Psych: No Suicidal ideations    All remaining systems reviewed and found to be negative, except as stated above.    Exam:    /82   Pulse 86   Temp 36.7 °C (98.1 °F) (Temporal)   Resp 14   Ht 1.778 m (5' 10\")   Wt 96.6 kg (213 lb)   SpO2 96%  Body mass index is 30.56 kg/m².    General:  Well nourished, well developed female in NAD  HENT: Atraumatic, normocephalic  EYES: Extraocular movements intact  NECK: Supple, FROM  CHEST: No deformities, Equal chest expansion  RESP: Unlabored, no stridor or audible wheeze  HEART: Regular Rate and rhythm.   Extremities: No Clubbing, Cyanosis, or Edema  Skin: Warm/dry, without rashes  Neuro: A/O x 4, due to COVID-19- did not have patient remove face mask to test cranial nerves.  Motor/sensory grossly intact  Psych: Normal behavior, normal affect      Lab review:  Labs are reviewed and discussed with a patient  Lab Results   Component Value Date/Time    CHOLSTRLTOT 217 (H) 06/22/2021 10:22 AM     (H) " 06/22/2021 10:22 AM    HDL 60 06/22/2021 10:22 AM    TRIGLYCERIDE 145 06/22/2021 10:22 AM       Lab Results   Component Value Date/Time    SODIUM 142 06/22/2021 10:22 AM    POTASSIUM 4.3 06/22/2021 10:22 AM    CHLORIDE 104 06/22/2021 10:22 AM    CO2 25 06/22/2021 10:22 AM    GLUCOSE 90 06/22/2021 10:22 AM    BUN 18 06/22/2021 10:22 AM    CREATININE 0.82 06/22/2021 10:22 AM     Lab Results   Component Value Date/Time    ALKPHOSPHAT 80 06/22/2021 10:22 AM    ASTSGOT 16 06/22/2021 10:22 AM    ALTSGPT 12 06/22/2021 10:22 AM    TBILIRUBIN 1.0 06/22/2021 10:22 AM      Lab Results   Component Value Date/Time    HBA1C 5.6 06/22/2021 10:22 AM    HBA1C 5.9 (H) 07/17/2019 08:04 AM     No results found for: TSH  No results found for: FREET4    Lab Results   Component Value Date/Time    WBC 5.3 06/22/2021 10:22 AM    RBC 5.47 (H) 06/22/2021 10:22 AM    HEMOGLOBIN 17.2 (H) 06/22/2021 10:22 AM    HEMATOCRIT 52.1 (H) 06/22/2021 10:22 AM    MCV 95.2 06/22/2021 10:22 AM    MCH 31.4 06/22/2021 10:22 AM    MCHC 33.0 (L) 06/22/2021 10:22 AM    MPV 10.0 06/22/2021 10:22 AM    NEUTSPOLYS 62.10 06/22/2021 10:22 AM    LYMPHOCYTES 28.20 06/22/2021 10:22 AM    MONOCYTES 6.30 06/22/2021 10:22 AM    EOSINOPHILS 2.10 06/22/2021 10:22 AM    BASOPHILS 0.90 06/22/2021 10:22 AM          Assessment/Plan:  1. Abnormal CBC  - JAK2 GENE MUT RFLX CALR RFLX MPL; Future  - CBC WITH DIFFERENTIAL; Future  2. Elevated ferritin  3. Elevated hemoglobin (HCC)  - JAK2 GENE MUT RFLX CALR RFLX MPL; Future  - CBC WITH DIFFERENTIAL; Future  4. Elevated hematocrit  - JAK2 GENE MUT RFLX CALR RFLX MPL; Future  - CBC WITH DIFFERENTIAL; Future   Work up showed heterozygous for hereditary hemachromatosis.  Suspect this is contributing factor to elevated ferritin level.  Patient is started donating blood routinely.  Next donation will be in October 2021.  I would like to add a JAK2 mutation test to her repeat CBC.  If this is negative we will continue to monitor her CBC  with routine blood donations.    Follow-up: Return in about 3 months (around 12/13/2021) for Follow up on labs.    Please note that this dictation was created using voice recognition software. I have made every reasonable attempt to correct obvious errors, but I expect that there are errors of grammar and possibly content that I did not discover before finalizing the note.

## 2021-09-30 ENCOUNTER — HOSPITAL ENCOUNTER (OUTPATIENT)
Dept: LAB | Facility: MEDICAL CENTER | Age: 66
End: 2021-09-30
Attending: PHYSICIAN ASSISTANT
Payer: MEDICARE

## 2021-09-30 DIAGNOSIS — R79.89 ABNORMAL CBC: ICD-10-CM

## 2021-09-30 DIAGNOSIS — R71.8 ELEVATED HEMATOCRIT: ICD-10-CM

## 2021-09-30 DIAGNOSIS — D58.2 ELEVATED HEMOGLOBIN (HCC): ICD-10-CM

## 2021-09-30 LAB
BASOPHILS # BLD AUTO: 0.9 % (ref 0–1.8)
BASOPHILS # BLD: 0.04 K/UL (ref 0–0.12)
EOSINOPHIL # BLD AUTO: 0.11 K/UL (ref 0–0.51)
EOSINOPHIL NFR BLD: 2.4 % (ref 0–6.9)
ERYTHROCYTE [DISTWIDTH] IN BLOOD BY AUTOMATED COUNT: 45.3 FL (ref 35.9–50)
HCT VFR BLD AUTO: 49.1 % (ref 37–47)
HGB BLD-MCNC: 16.2 G/DL (ref 12–16)
IMM GRANULOCYTES # BLD AUTO: 0.02 K/UL (ref 0–0.11)
IMM GRANULOCYTES NFR BLD AUTO: 0.4 % (ref 0–0.9)
LYMPHOCYTES # BLD AUTO: 1.3 K/UL (ref 1–4.8)
LYMPHOCYTES NFR BLD: 28.5 % (ref 22–41)
MCH RBC QN AUTO: 31.1 PG (ref 27–33)
MCHC RBC AUTO-ENTMCNC: 33 G/DL (ref 33.6–35)
MCV RBC AUTO: 94.2 FL (ref 81.4–97.8)
MONOCYTES # BLD AUTO: 0.32 K/UL (ref 0–0.85)
MONOCYTES NFR BLD AUTO: 7 % (ref 0–13.4)
NEUTROPHILS # BLD AUTO: 2.77 K/UL (ref 2–7.15)
NEUTROPHILS NFR BLD: 60.8 % (ref 44–72)
NRBC # BLD AUTO: 0 K/UL
NRBC BLD-RTO: 0 /100 WBC
PLATELET # BLD AUTO: 236 K/UL (ref 164–446)
PMV BLD AUTO: 10.3 FL (ref 9–12.9)
RBC # BLD AUTO: 5.21 M/UL (ref 4.2–5.4)
WBC # BLD AUTO: 4.6 K/UL (ref 4.8–10.8)

## 2021-09-30 PROCEDURE — 85025 COMPLETE CBC W/AUTO DIFF WBC: CPT

## 2021-09-30 PROCEDURE — 36415 COLL VENOUS BLD VENIPUNCTURE: CPT

## 2021-09-30 PROCEDURE — 81270 JAK2 GENE: CPT | Mod: GA

## 2021-09-30 PROCEDURE — 81338 MPL GENE COMMON VARIANTS: CPT

## 2021-09-30 PROCEDURE — 81219 CALR GENE COM VARIANTS: CPT

## 2021-10-04 ENCOUNTER — OFFICE VISIT (OUTPATIENT)
Dept: MEDICAL GROUP | Facility: PHYSICIAN GROUP | Age: 66
End: 2021-10-04
Payer: MEDICARE

## 2021-10-04 VITALS
HEIGHT: 70 IN | SYSTOLIC BLOOD PRESSURE: 110 MMHG | TEMPERATURE: 97.8 F | BODY MASS INDEX: 30.92 KG/M2 | OXYGEN SATURATION: 98 % | WEIGHT: 216 LBS | HEART RATE: 64 BPM | RESPIRATION RATE: 12 BRPM | DIASTOLIC BLOOD PRESSURE: 82 MMHG

## 2021-10-04 DIAGNOSIS — R79.89 ELEVATED FERRITIN: ICD-10-CM

## 2021-10-04 DIAGNOSIS — Z23 NEED FOR VACCINATION: ICD-10-CM

## 2021-10-04 DIAGNOSIS — Z00.00 MEDICARE ANNUAL WELLNESS VISIT, SUBSEQUENT: Primary | ICD-10-CM

## 2021-10-04 DIAGNOSIS — E78.5 DYSLIPIDEMIA: ICD-10-CM

## 2021-10-04 PROCEDURE — 90662 IIV NO PRSV INCREASED AG IM: CPT | Performed by: PHYSICIAN ASSISTANT

## 2021-10-04 PROCEDURE — G0008 ADMIN INFLUENZA VIRUS VAC: HCPCS | Performed by: PHYSICIAN ASSISTANT

## 2021-10-04 PROCEDURE — G0439 PPPS, SUBSEQ VISIT: HCPCS | Mod: 25 | Performed by: PHYSICIAN ASSISTANT

## 2021-10-04 ASSESSMENT — PATIENT HEALTH QUESTIONNAIRE - PHQ9: CLINICAL INTERPRETATION OF PHQ2 SCORE: 0

## 2021-10-04 ASSESSMENT — ACTIVITIES OF DAILY LIVING (ADL): BATHING_REQUIRES_ASSISTANCE: 0

## 2021-10-04 ASSESSMENT — FIBROSIS 4 INDEX: FIB4 SCORE: 1.31

## 2021-10-04 ASSESSMENT — ENCOUNTER SYMPTOMS: GENERAL WELL-BEING: GOOD

## 2021-10-04 NOTE — PATIENT INSTRUCTIONS
Advance Directive    Advance directives are legal documents that let you make choices ahead of time about your health care and medical treatment in case you become unable to communicate for yourself. Advance directives are a way for you to communicate your wishes to family, friends, and health care providers. This can help convey your decisions about end-of-life care if you become unable to communicate.  Discussing and writing advance directives should happen over time rather than all at once. Advance directives can be changed depending on your situation and what you want, even after you have signed the advance directives.  If you do not have an advance directive, some states assign family decision makers to act on your behalf based on how closely you are related to them. Each state has its own laws regarding advance directives. You may want to check with your health care provider, , or state representative about the laws in your state. There are different types of advance directives, such as:  · Medical power of .  · Living will.  · Do not resuscitate (DNR) or do not attempt resuscitation (DNAR) order.  Health care proxy and medical power of   A health care proxy, also called a health care agent, is a person who is appointed to make medical decisions for you in cases in which you are unable to make the decisions yourself. Generally, people choose someone they know well and trust to represent their preferences. Make sure to ask this person for an agreement to act as your proxy. A proxy may have to exercise judgment in the event of a medical decision for which your wishes are not known.  A medical power of  is a legal document that names your health care proxy. Depending on the laws in your state, after the document is written, it may also need to be:  · Signed.  · Notarized.  · Dated.  · Copied.  · Witnessed.  · Incorporated into your medical record.  You may also want to appoint  someone to manage your financial affairs in a situation in which you are unable to do so. This is called a durable power of  for finances. It is a separate legal document from the durable power of  for health care. You may choose the same person or someone different from your health care proxy to act as your agent in financial matters.  If you do not appoint a proxy, or if there is a concern that the proxy is not acting in your best interests, a court-appointed guardian may be designated to act on your behalf.  Living will  A living will is a set of instructions documenting your wishes about medical care when you cannot express them yourself. Health care providers should keep a copy of your living will in your medical record. You may want to give a copy to family members or friends. To alert caregivers in case of an emergency, you can place a card in your wallet to let them know that you have a living will and where they can find it. A living will is used if you become:  · Terminally ill.  · Incapacitated.  · Unable to communicate or make decisions.  Items to consider in your living will include:  · The use or non-use of life-sustaining equipment, such as dialysis machines and breathing machines (ventilators).  · A DNR or DNAR order, which is the instruction not to use cardiopulmonary resuscitation (CPR) if breathing or heartbeat stops.  · The use or non-use of tube feeding.  · Withholding of food and fluids.  · Comfort (palliative) care when the goal becomes comfort rather than a cure.  · Organ and tissue donation.  A living will does not give instructions for distributing your money and property if you should pass away. It is recommended that you seek the advice of a  when writing a will. Decisions about taxes, beneficiaries, and asset distribution will be legally binding. This process can relieve your family and friends of any concerns surrounding disputes or questions that may come up about  the distribution of your assets.  DNR or DNAR  A DNR or DNAR order is a request not to have CPR in the event that your heart stops beating or you stop breathing. If a DNR or DNAR order has not been made and shared, a health care provider will try to help any patient whose heart has stopped or who has stopped breathing. If you plan to have surgery, talk with your health care provider about how your DNR or DNAR order will be followed if problems occur.  Summary  · Advance directives are the legal documents that allow you to make choices ahead of time about your health care and medical treatment in case you become unable to communicate for yourself.  · The process of discussing and writing advance directives should happen over time. You can change the advance directives, even after you have signed them.  · Advance directives include DNR or DNAR orders, living rogers, and designating an agent as your medical power of .  This information is not intended to replace advice given to you by your health care provider. Make sure you discuss any questions you have with your health care provider.  Document Released: 03/26/2009 Document Revised: 01/22/2020 Document Reviewed: 11/06/2017  Elsevier Patient Education © 2020 [employer]

## 2021-10-04 NOTE — PROGRESS NOTES
Chief Complaint   Patient presents with   • Annual Wellness Visit       HPI:  Mckenzie is a 65 y.o. here for Medicare Annual Wellness Visit      Patient Active Problem List    Diagnosis Date Noted   • Abnormal CBC 06/24/2021   • Dysphagia 06/24/2021   • Dry throat 12/30/2020   • Dyslipidemia 12/30/2020   • Vitamin D deficiency 12/30/2020   • Screening for colon cancer 03/15/2020   • Encounter for screening mammogram for breast cancer 03/15/2020   • Postmenopausal 03/15/2020   • Screening for colorectal cancer 04/16/2019   • Elevated glucose 04/16/2019   • BMI 31.0-31.9,adult 01/25/2018   • Need for vaccination 01/25/2018   • Unspecified glaucoma 02/07/2006       Current Outpatient Medications   Medication Sig Dispense Refill   • ASPIRIN 81 PO Take 1 tablet by mouth 2 (two) times a day.     • Calcium Carb-Cholecalciferol (CALCIUM + VITAMIN D3 PO) Take  by mouth.     • latanoprost (XALATAN) 0.005 % Solution Place 1 Drop in both eyes every evening.       No current facility-administered medications for this visit.        Patient is taking medications as noted in medication list.  Current supplements as per medication list.     Allergies: Patient has no known allergies.    Current social contact/activities: Yes / Yes    Is patient current with immunizations? No, due for FLU. Patient is interested in receiving FLU today.    She  reports that she has never smoked. She has never used smokeless tobacco. She reports that she does not drink alcohol and does not use drugs.  Counseling given: Not Answered      DPA/Advanced directive: Patient does not have an Advanced Directive.  A packet and workshop information was given on Advanced Directives.  Has trust- will bring in copy of medical portion      ROS:    Gait: Uses no assistive device   Ostomy: No   Other tubes: No   Amputations: No   Chronic oxygen use No   Last eye exam 2021   Wears hearing aids: No   : Reports urinary leakage during the last 6 months that has not  interfered at all with their daily activities or sleep.      Screening:  Depression Screening  Little interest or pleasure in doing things?  0 - not at all  Feeling down, depressed, or hopeless? 0 - not at all  Trouble falling or staying asleep, or sleeping too much?     Feeling tired or having little energy?     Poor appetite or overeating?     Feeling bad about yourself - or that you are a failure or have let yourself or your family down?    Trouble concentrating on things, such as reading the newspaper or watching television?    Moving or speaking so slowly that other people could have noticed.  Or the opposite - being so fidgety or restless that you have been moving around a lot more than usual?     Thoughts that you would be better off dead, or of hurting yourself?     Patient Health Questionnaire Score:      If depressive symptoms identified deferred to follow up visit unless specifically addressed in assessment and plan.    Interpretation of PHQ-9 Total Score   Score Severity   1-4 No Depression   5-9 Mild Depression   10-14 Moderate Depression   15-19 Moderately Severe Depression   20-27 Severe Depression      Screening for Cognitive Impairment  Three Minute Recall (captain, ronan, picture)  2/3    Draw clock face with all 12 numbers and set the hands to show 5 past 8.  Yes    If cognitive concerns identified, deferred for follow up unless specifically addressed in assessment and plan.    Fall Risk Assessment  Has the patient had two or more falls in the last year or any fall with injury in the last year?  No  If fall risk identified, deferred for follow up unless specifically addressed in assessment and plan.    Safety Assessment  Throw rugs on floor.  No  Handrails on all stairs.  Yes  Good lighting in all hallways.  Yes  Difficulty hearing.  No  Patient counseled about all safety risks that were identified.    Functional Assessment ADLs  Are there any barriers preventing you from cooking for yourself or  meeting nutritional needs?  No.    Are there any barriers preventing you from driving safely or obtaining transportation?  No.    Are there any barriers preventing you from using a telephone or calling for help?  No.    Are there any barriers preventing you from shopping?  No.    Are there any barriers preventing you from taking care of your own finances?  No.    Are there any barriers preventing you from managing your medications?    No.    Are there any barriers preventing you from showering, bathing or dressing yourself?  No.    Are you currently engaging in any exercise or physical activity?  Yes.     What is your perception of your health?  Good.    Health Maintenance Summary                IMM ZOSTER VACCINES Next Due 10/28/2021      Done 9/2/2021 Imm Admin: Zoster Vaccine Recombinant (RZV) (SHINGRIX)     Patient has more history with this topic...    MAMMOGRAM Next Due 8/4/2022      Done 8/4/2021 MA-SCREENING MAMMO BILAT W/TOMOSYNTHESIS W/CAD     Patient has more history with this topic...    Annual Wellness Visit Next Due 10/5/2022      Done 10/4/2021 Visit Dx: Medicare annual wellness visit, subsequent     Patient has more history with this topic...    COLORECTAL CANCER SCREENING Next Due 3/25/2023     BONE DENSITY Next Due 6/23/2025      Done 6/23/2020 DS-BONE DENSITY STUDY (DEXA)    IMM DTaP/Tdap/Td Vaccine Next Due 1/25/2028      Done 1/25/2018 Imm Admin: Tdap Vaccine     Patient has more history with this topic...          Patient Care Team:  Leslie Bay P.A.-C. as PCP - General (Physician Assistant)  Gentry Farley M.D. as Consulting Physician (Ophthalmology)    Social History     Tobacco Use   • Smoking status: Never Smoker   • Smokeless tobacco: Never Used   Vaping Use   • Vaping Use: Never used   Substance Use Topics   • Alcohol use: No   • Drug use: No     Family History   Problem Relation Age of Onset   • Heart Attack Mother 76   • Kidney Disease Mother    • Lung Cancer Father 56   •  "Alcohol/Drug Father         Alcoholic   • Other Sister         Low BP   • Other Brother         Back and joint issues   • Lung Disease Maternal Grandmother    • No Known Problems Maternal Grandfather    • No Known Problems Paternal Grandmother    • Other Paternal Grandfather         Suicide   • Alcohol/Drug Paternal Grandfather    • Hypertension Sister    • No Known Problems Son    • No Known Problems Son      She  has no past medical history on file.   Past Surgical History:   Procedure Laterality Date   • WRIST ORIF Left 03/2017   • FOOT SURGERY Right 80's   • TONSILLECTOMY  60's         Exam:   /82   Pulse 64   Temp 36.6 °C (97.8 °F) (Temporal)   Resp 12   Ht 1.778 m (5' 10\")   Wt 98 kg (216 lb)   SpO2 98%  Body mass index is 30.99 kg/m².    Hearing excellent.    Dentition good  Alert, oriented in no acute distress.  Eye contact is good, speech goal directed, affect calm  Yes    Assessment and Plan. The following treatment and monitoring plan is recommended:   1. Medicare annual wellness visit, subsequent    2. Need for vaccination  - Influenza Vaccine, High Dose (65+ Only)    3. Elevated ferritin  - IRON/TOTAL IRON BIND; Future  - FERRITIN; Future  - CBC WITHOUT DIFFERENTIAL; Future  Carrier for hereditary hemochromatosis.  Continue donating blood, repeat labs in 3 months.  Hemoglobin and hematocrit slightly improved compared to previous on her most recent labs.  4. Dyslipidemia  - Lipid Profile; Future  Repeat labs 90 days.    Services suggested: No services needed at this time  Health Care Screening recommendations as per orders if indicated.  Referrals offered: PT/OT/Nutrition counseling/Behavioral Health/Smoking cessation as per orders if indicated.    Discussion today about general wellness and lifestyle habits:    · Prevent falls and reduce trip hazards; Cautioned about securing or removing rugs.  · Have a working fire alarm and carbon monoxide detector;   · Engage in regular physical activity " and social activities     Follow-up: Return for for routine as scheduled.

## 2021-10-06 LAB
JAK2 P.V617F BLD/T QL: NOT DETECTED
SPECIMEN SOURCE: NORMAL

## 2021-10-13 LAB — GENE XXX MUT ANL BLD/T: NOT DETECTED

## 2021-10-21 LAB — MPL P.W515 BLD/T QL: NOT DETECTED

## 2022-01-03 ENCOUNTER — HOSPITAL ENCOUNTER (OUTPATIENT)
Dept: LAB | Facility: MEDICAL CENTER | Age: 67
End: 2022-01-03
Attending: PHYSICIAN ASSISTANT
Payer: MEDICARE

## 2022-01-03 DIAGNOSIS — R79.89 ELEVATED FERRITIN: ICD-10-CM

## 2022-01-03 DIAGNOSIS — E78.5 DYSLIPIDEMIA: ICD-10-CM

## 2022-01-03 LAB
CHOLEST SERPL-MCNC: 217 MG/DL (ref 100–199)
ERYTHROCYTE [DISTWIDTH] IN BLOOD BY AUTOMATED COUNT: 45.5 FL (ref 35.9–50)
FERRITIN SERPL-MCNC: 38.8 NG/ML (ref 10–291)
HCT VFR BLD AUTO: 49.6 % (ref 37–47)
HDLC SERPL-MCNC: 62 MG/DL
HGB BLD-MCNC: 15.9 G/DL (ref 12–16)
IRON SATN MFR SERPL: 14 % (ref 15–55)
IRON SERPL-MCNC: 50 UG/DL (ref 40–170)
LDLC SERPL CALC-MCNC: 124 MG/DL
MCH RBC QN AUTO: 30.2 PG (ref 27–33)
MCHC RBC AUTO-ENTMCNC: 32.1 G/DL (ref 33.6–35)
MCV RBC AUTO: 94.3 FL (ref 81.4–97.8)
PLATELET # BLD AUTO: 308 K/UL (ref 164–446)
PMV BLD AUTO: 10.2 FL (ref 9–12.9)
RBC # BLD AUTO: 5.26 M/UL (ref 4.2–5.4)
TIBC SERPL-MCNC: 367 UG/DL (ref 250–450)
TRIGL SERPL-MCNC: 157 MG/DL (ref 0–149)
UIBC SERPL-MCNC: 317 UG/DL (ref 110–370)
WBC # BLD AUTO: 6.8 K/UL (ref 4.8–10.8)

## 2022-01-03 PROCEDURE — 83540 ASSAY OF IRON: CPT

## 2022-01-03 PROCEDURE — 80061 LIPID PANEL: CPT

## 2022-01-03 PROCEDURE — 36415 COLL VENOUS BLD VENIPUNCTURE: CPT

## 2022-01-03 PROCEDURE — 82728 ASSAY OF FERRITIN: CPT

## 2022-01-03 PROCEDURE — 83550 IRON BINDING TEST: CPT

## 2022-01-03 PROCEDURE — 85027 COMPLETE CBC AUTOMATED: CPT

## 2022-01-18 ENCOUNTER — OFFICE VISIT (OUTPATIENT)
Dept: MEDICAL GROUP | Facility: PHYSICIAN GROUP | Age: 67
End: 2022-01-18
Payer: MEDICARE

## 2022-01-18 VITALS
RESPIRATION RATE: 20 BRPM | HEART RATE: 74 BPM | HEIGHT: 70 IN | WEIGHT: 214 LBS | BODY MASS INDEX: 30.64 KG/M2 | SYSTOLIC BLOOD PRESSURE: 136 MMHG | DIASTOLIC BLOOD PRESSURE: 84 MMHG | TEMPERATURE: 96.8 F | OXYGEN SATURATION: 98 %

## 2022-01-18 DIAGNOSIS — R73.09 ELEVATED GLUCOSE: ICD-10-CM

## 2022-01-18 DIAGNOSIS — E78.5 DYSLIPIDEMIA: ICD-10-CM

## 2022-01-18 DIAGNOSIS — E55.9 VITAMIN D DEFICIENCY: ICD-10-CM

## 2022-01-18 DIAGNOSIS — R79.89 ABNORMAL CBC: ICD-10-CM

## 2022-01-18 PROBLEM — Z12.11 SCREENING FOR COLORECTAL CANCER: Status: RESOLVED | Noted: 2019-04-16 | Resolved: 2022-01-18

## 2022-01-18 PROBLEM — Z12.11 SCREENING FOR COLON CANCER: Status: RESOLVED | Noted: 2020-03-15 | Resolved: 2022-01-18

## 2022-01-18 PROBLEM — Z12.31 ENCOUNTER FOR SCREENING MAMMOGRAM FOR BREAST CANCER: Status: RESOLVED | Noted: 2020-03-15 | Resolved: 2022-01-18

## 2022-01-18 PROBLEM — Z12.12 SCREENING FOR COLORECTAL CANCER: Status: RESOLVED | Noted: 2019-04-16 | Resolved: 2022-01-18

## 2022-01-18 PROBLEM — Z23 NEED FOR VACCINATION: Status: RESOLVED | Noted: 2018-01-25 | Resolved: 2022-01-18

## 2022-01-18 PROCEDURE — 99214 OFFICE O/P EST MOD 30 MIN: CPT | Performed by: NURSE PRACTITIONER

## 2022-01-18 RX ORDER — MV-MIN/FOLIC/VIT K/LYCOP/COQ10 200-100MCG
CAPSULE ORAL
COMMUNITY
End: 2022-01-18

## 2022-01-18 RX ORDER — ASPIRIN 81 MG/1
1 TABLET, CHEWABLE ORAL
COMMUNITY

## 2022-01-18 ASSESSMENT — FIBROSIS 4 INDEX: FIB4 SCORE: 1.02

## 2022-01-18 ASSESSMENT — PATIENT HEALTH QUESTIONNAIRE - PHQ9: CLINICAL INTERPRETATION OF PHQ2 SCORE: 0

## 2022-01-18 NOTE — ASSESSMENT & PLAN NOTE
Chronic, stable.  Denies fatigue.  Denies any muscle weakness.  No history of CKD.  Reports having a good diet, including dairy products.  No history of IBD's, celiac, CF, or surgeries causing malabsorption.  Patient is not obese.  Is taking vitamin d and calcium supplement.   Due for updated labs.     3/18/2020 11:04 6/22/2021 10:22   25-Hydroxy   Vitamin D 25 26 (L) 65

## 2022-01-18 NOTE — ASSESSMENT & PLAN NOTE
Chronic and improving. She is donating blood every 8 weeks to help with her iron levels. Due for updated labs July 2022.

## 2022-01-18 NOTE — PROGRESS NOTES
"Subjective  Chief Complaint  Establish care to manage her chronic conditions    History of Present Illness  Mckenzie Hitchcock is a 66 y.o. female. This patient is here today to establish care.  Her prior PCP was SARAHI Serrano.    Vitamin D deficiency  Chronic, stable.  Denies fatigue.  Denies any muscle weakness.  No history of CKD.  Reports having a good diet, including dairy products.  No history of IBD's, celiac, CF, or surgeries causing malabsorption.  Patient is not obese.  Is taking vitamin d and calcium supplement.   Due for updated labs.     3/18/2020 11:04 6/22/2021 10:22   25-Hydroxy   Vitamin D 25 26 (L) 65       Elevated glucose  Chronic and stable.  Due for updated labs.     2/20/2019 07:42 3/18/2020 11:04 6/22/2021 10:22   Glucose 118 (H) 85 90       Dyslipidemia  Chronic, ongoing.  Not taking any cholesterol lowering medications.  10-year cardiac risk ASCVD score: 8.6%  Reports diet is \"okay\".   She is following a low-cholesterol diet.  She is exercising regularly.  She is not taking ASA daily.  She denies dizziness, claudication, or chest pain.  Due for updated lab work July 2022.     3/18/2020 11:04 6/22/2021 10:22 1/3/2022 15:32   Cholesterol,Tot 210 (H) 217 (H) 217 (H)   Triglycerides 125 145 157 (H)   HDL 67 60 62    (H) 128 (H) 124 (H)       Abnormal CBC  Chronic and improving. She is donating blood every 8 weeks to help with her iron levels. Due for updated labs July 2022.    Past Medical History    Allergies: Patient has no known allergies.  Past Medical History:   Diagnosis Date   • Encounter for screening mammogram for breast cancer 3/15/2020   • Need for vaccination 1/25/2018   • Screening for colon cancer 3/15/2020   • Screening for colorectal cancer 4/16/2019     Past Surgical History:   Procedure Laterality Date   • ORIF, WRIST Left 03/2017   • FOOT SURGERY Right 80's   • TONSILLECTOMY  60's     Current Outpatient Medications Ordered in Epic   Medication Sig Dispense " "Refill   • aspirin (ASA) 81 MG Chew Tab chewable tablet Chew 1 Tablet.     • Calcium Carb-Cholecalciferol (CALCIUM + VITAMIN D3 PO) Take  by mouth.     • latanoprost (XALATAN) 0.005 % Solution Place 1 Drop in both eyes every evening.       No current Epic-ordered facility-administered medications on file.     Family History:    Family History   Problem Relation Age of Onset   • Heart Attack Mother 76   • Kidney Disease Mother    • Lung Cancer Father 56   • Alcohol/Drug Father         Alcoholic   • Other Sister         Low BP   • Other Brother         Back and joint issues   • Lung Disease Maternal Grandmother    • No Known Problems Maternal Grandfather    • No Known Problems Paternal Grandmother    • Other Paternal Grandfather         Suicide   • Alcohol/Drug Paternal Grandfather    • Hypertension Sister    • No Known Problems Son    • No Known Problems Son       Personal/Social History:    Social History     Tobacco Use   • Smoking status: Never Smoker   • Smokeless tobacco: Never Used   Vaping Use   • Vaping Use: Never used   Substance Use Topics   • Alcohol use: No   • Drug use: No     Social History     Social History Narrative   • Not on file      Review of Systems:     General: Negative for fever/chills and unexpected weight change.    Eyes:  Negative for vision changes, eye pain.   Respiratory:  Negative for cough and dyspnea.     Cardiovascular:  Negative for chest pain and palpitations.   Musculoskeletal:  Negative for myalgias.    Skin:  Negative for rash.    Neurological:  Negative for numbness/tingling and headaches.    Heme/Lymph:  Does not bruise/bleed easily.     Objective  Physical Exam:   /84 (BP Location: Right arm, Patient Position: Sitting, BP Cuff Size: Adult)   Pulse 74   Temp 36 °C (96.8 °F) (Temporal)   Resp 20   Ht 1.778 m (5' 10\")   Wt 97.1 kg (214 lb)   SpO2 98%  Body mass index is 30.71 kg/m².  General:  Alert and oriented.  Well appearing.  NAD.  Head:  Normocephalic.   Neck: " Supple without JVD. No lymphadenopathy.  Pulmonary:  Normal effort.  Clear to ausculation without rales, ronchi, or wheezing.  Cardiovascular:  Regular rate and rhythm without murmur, rubs or gallop.  Radial pulses are intact and equal bilaterally.  Musculoskeletal:  No extremity cyanosis, clubbing, or edema.  Skin:  Warm and dry.  No obvious lesions.  Psych: Normal mood and affect. Alert and oriented x3. Judgment and insight is normal.      Assessment/Plan   1. Dyslipidemia  Chronic and ongoing.  Educated on OTC supplements that she can take to help with her Dyslipidemia, she does not want to start any Statin medications at this time.  Educated on a healthy diet and exercise.  Due for updated labs July 2022.  - Lipid Profile; Future    2. Vitamin D deficiency  Chronic and stable.  Continue to take Vitamin D and Calcium supplement.  Due for updated labs July 2022.  - VITAMIN D,25 HYDROXY; Future    3. Elevated glucose  Chronic and stable.  Educated on a healthy diet and exercise.  Due for updated labs July 2022.  - Comp Metabolic Panel; Future    4. Abnormal CBC  Chronic and improving.  Continue to donate blood every 8 weeks.  Due for updated labs July 2022.  - CBC WITH DIFFERENTIAL; Future  - IRON/TOTAL IRON BIND; Future  - FERRITIN; Future      Health Maintenance: Completed    Return in about 6 months (around 7/18/2022), or if symptoms worsen or fail to improve.    Please note that this dictation was created using voice recognition software. I have made every reasonable attempt to correct obvious errors, but I expect that there are errors of grammar and possibly content that I did not discover before finalizing the note.    CLAUDIA Rainey  [facility]

## 2022-01-18 NOTE — ASSESSMENT & PLAN NOTE
Chronic and stable.  Due for updated labs.     2/20/2019 07:42 3/18/2020 11:04 6/22/2021 10:22   Glucose 118 (H) 85 90

## 2022-01-18 NOTE — ASSESSMENT & PLAN NOTE
"Chronic, ongoing.  Not taking any cholesterol lowering medications.  10-year cardiac risk ASCVD score: 8.6%  Reports diet is \"okay\".   She is following a low-cholesterol diet.  She is exercising regularly.  She is not taking ASA daily.  She denies dizziness, claudication, or chest pain.  Due for updated lab work July 2022.     3/18/2020 11:04 6/22/2021 10:22 1/3/2022 15:32   Cholesterol,Tot 210 (H) 217 (H) 217 (H)   Triglycerides 125 145 157 (H)   HDL 67 60 62    (H) 128 (H) 124 (H)     "

## 2022-07-14 ENCOUNTER — HOSPITAL ENCOUNTER (OUTPATIENT)
Dept: LAB | Facility: MEDICAL CENTER | Age: 67
End: 2022-07-14
Attending: NURSE PRACTITIONER
Payer: MEDICARE

## 2022-07-14 DIAGNOSIS — E55.9 VITAMIN D DEFICIENCY: ICD-10-CM

## 2022-07-14 DIAGNOSIS — E78.5 DYSLIPIDEMIA: ICD-10-CM

## 2022-07-14 DIAGNOSIS — R79.89 ABNORMAL CBC: ICD-10-CM

## 2022-07-14 DIAGNOSIS — R73.09 ELEVATED GLUCOSE: ICD-10-CM

## 2022-07-14 LAB
25(OH)D3 SERPL-MCNC: 78 NG/ML (ref 30–100)
ALBUMIN SERPL BCP-MCNC: 4.7 G/DL (ref 3.2–4.9)
ALBUMIN/GLOB SERPL: 1.4 G/DL
ALP SERPL-CCNC: 106 U/L (ref 30–99)
ALT SERPL-CCNC: 8 U/L (ref 2–50)
ANION GAP SERPL CALC-SCNC: 13 MMOL/L (ref 7–16)
AST SERPL-CCNC: 10 U/L (ref 12–45)
BASOPHILS # BLD AUTO: 1 % (ref 0–1.8)
BASOPHILS # BLD: 0.05 K/UL (ref 0–0.12)
BILIRUB SERPL-MCNC: 0.8 MG/DL (ref 0.1–1.5)
BUN SERPL-MCNC: 16 MG/DL (ref 8–22)
CALCIUM SERPL-MCNC: 9.9 MG/DL (ref 8.5–10.5)
CHLORIDE SERPL-SCNC: 104 MMOL/L (ref 96–112)
CHOLEST SERPL-MCNC: 218 MG/DL (ref 100–199)
CO2 SERPL-SCNC: 24 MMOL/L (ref 20–33)
CREAT SERPL-MCNC: 0.76 MG/DL (ref 0.5–1.4)
EOSINOPHIL # BLD AUTO: 0.11 K/UL (ref 0–0.51)
EOSINOPHIL NFR BLD: 2.2 % (ref 0–6.9)
ERYTHROCYTE [DISTWIDTH] IN BLOOD BY AUTOMATED COUNT: 47.9 FL (ref 35.9–50)
FASTING STATUS PATIENT QL REPORTED: NORMAL
FERRITIN SERPL-MCNC: 19.2 NG/ML (ref 10–291)
GFR SERPLBLD CREATININE-BSD FMLA CKD-EPI: 85 ML/MIN/1.73 M 2
GLOBULIN SER CALC-MCNC: 3.3 G/DL (ref 1.9–3.5)
GLUCOSE SERPL-MCNC: 98 MG/DL (ref 65–99)
HCT VFR BLD AUTO: 47.5 % (ref 37–47)
HDLC SERPL-MCNC: 61 MG/DL
HGB BLD-MCNC: 15 G/DL (ref 12–16)
IMM GRANULOCYTES # BLD AUTO: 0.01 K/UL (ref 0–0.11)
IMM GRANULOCYTES NFR BLD AUTO: 0.2 % (ref 0–0.9)
IRON SATN MFR SERPL: 18 % (ref 15–55)
IRON SERPL-MCNC: 68 UG/DL (ref 40–170)
LDLC SERPL CALC-MCNC: 125 MG/DL
LYMPHOCYTES # BLD AUTO: 1.45 K/UL (ref 1–4.8)
LYMPHOCYTES NFR BLD: 28.7 % (ref 22–41)
MCH RBC QN AUTO: 27.8 PG (ref 27–33)
MCHC RBC AUTO-ENTMCNC: 31.6 G/DL (ref 33.6–35)
MCV RBC AUTO: 88 FL (ref 81.4–97.8)
MONOCYTES # BLD AUTO: 0.36 K/UL (ref 0–0.85)
MONOCYTES NFR BLD AUTO: 7.1 % (ref 0–13.4)
NEUTROPHILS # BLD AUTO: 3.07 K/UL (ref 2–7.15)
NEUTROPHILS NFR BLD: 60.8 % (ref 44–72)
NRBC # BLD AUTO: 0 K/UL
NRBC BLD-RTO: 0 /100 WBC
PLATELET # BLD AUTO: 289 K/UL (ref 164–446)
PMV BLD AUTO: 9.5 FL (ref 9–12.9)
POTASSIUM SERPL-SCNC: 4.6 MMOL/L (ref 3.6–5.5)
PROT SERPL-MCNC: 8 G/DL (ref 6–8.2)
RBC # BLD AUTO: 5.4 M/UL (ref 4.2–5.4)
SODIUM SERPL-SCNC: 141 MMOL/L (ref 135–145)
TIBC SERPL-MCNC: 374 UG/DL (ref 250–450)
TRIGL SERPL-MCNC: 161 MG/DL (ref 0–149)
UIBC SERPL-MCNC: 306 UG/DL (ref 110–370)
WBC # BLD AUTO: 5.1 K/UL (ref 4.8–10.8)

## 2022-07-14 PROCEDURE — 82306 VITAMIN D 25 HYDROXY: CPT

## 2022-07-14 PROCEDURE — 36415 COLL VENOUS BLD VENIPUNCTURE: CPT

## 2022-07-14 PROCEDURE — 83550 IRON BINDING TEST: CPT

## 2022-07-14 PROCEDURE — 83540 ASSAY OF IRON: CPT

## 2022-07-14 PROCEDURE — 82728 ASSAY OF FERRITIN: CPT

## 2022-07-14 PROCEDURE — 85025 COMPLETE CBC W/AUTO DIFF WBC: CPT

## 2022-07-14 PROCEDURE — 80061 LIPID PANEL: CPT

## 2022-07-14 PROCEDURE — 80053 COMPREHEN METABOLIC PANEL: CPT

## 2022-08-24 ENCOUNTER — OFFICE VISIT (OUTPATIENT)
Dept: MEDICAL GROUP | Facility: PHYSICIAN GROUP | Age: 67
End: 2022-08-24
Payer: MEDICARE

## 2022-08-24 ENCOUNTER — HOSPITAL ENCOUNTER (OUTPATIENT)
Facility: MEDICAL CENTER | Age: 67
End: 2022-08-24
Attending: NURSE PRACTITIONER
Payer: MEDICARE

## 2022-08-24 ENCOUNTER — HOSPITAL ENCOUNTER (OUTPATIENT)
Dept: RADIOLOGY | Facility: MEDICAL CENTER | Age: 67
End: 2022-08-24
Attending: NURSE PRACTITIONER
Payer: MEDICARE

## 2022-08-24 VITALS
WEIGHT: 209.25 LBS | DIASTOLIC BLOOD PRESSURE: 72 MMHG | OXYGEN SATURATION: 98 % | TEMPERATURE: 98.2 F | SYSTOLIC BLOOD PRESSURE: 114 MMHG | BODY MASS INDEX: 29.96 KG/M2 | HEART RATE: 80 BPM | HEIGHT: 70 IN | RESPIRATION RATE: 20 BRPM

## 2022-08-24 DIAGNOSIS — N30.01 ACUTE CYSTITIS WITH HEMATURIA: ICD-10-CM

## 2022-08-24 DIAGNOSIS — Z12.31 VISIT FOR SCREENING MAMMOGRAM: ICD-10-CM

## 2022-08-24 DIAGNOSIS — R30.0 DYSURIA: ICD-10-CM

## 2022-08-24 LAB
APPEARANCE UR: CLEAR
BILIRUB UR STRIP-MCNC: NEGATIVE MG/DL
COLOR UR AUTO: YELLOW
GLUCOSE UR STRIP.AUTO-MCNC: NEGATIVE MG/DL
KETONES UR STRIP.AUTO-MCNC: NEGATIVE MG/DL
LEUKOCYTE ESTERASE UR QL STRIP.AUTO: NORMAL
NITRITE UR QL STRIP.AUTO: NEGATIVE
PH UR STRIP.AUTO: 6.5 [PH] (ref 5–8)
PROT UR QL STRIP: NEGATIVE MG/DL
RBC UR QL AUTO: NORMAL
SP GR UR STRIP.AUTO: 1.01
UROBILINOGEN UR STRIP-MCNC: 0.2 MG/DL

## 2022-08-24 PROCEDURE — 77063 BREAST TOMOSYNTHESIS BI: CPT

## 2022-08-24 PROCEDURE — 99213 OFFICE O/P EST LOW 20 MIN: CPT | Performed by: NURSE PRACTITIONER

## 2022-08-24 PROCEDURE — 87077 CULTURE AEROBIC IDENTIFY: CPT

## 2022-08-24 PROCEDURE — 81002 URINALYSIS NONAUTO W/O SCOPE: CPT | Performed by: NURSE PRACTITIONER

## 2022-08-24 PROCEDURE — 87186 SC STD MICRODIL/AGAR DIL: CPT

## 2022-08-24 PROCEDURE — 87086 URINE CULTURE/COLONY COUNT: CPT

## 2022-08-24 RX ORDER — SULFAMETHOXAZOLE AND TRIMETHOPRIM 800; 160 MG/1; MG/1
1 TABLET ORAL 2 TIMES DAILY
Qty: 6 TABLET | Refills: 0 | Status: SHIPPED | OUTPATIENT
Start: 2022-08-24 | End: 2022-08-27

## 2022-08-24 RX ORDER — LATANOPROST 50 UG/ML
1 SOLUTION/ DROPS OPHTHALMIC DAILY
COMMUNITY
End: 2022-08-24

## 2022-08-24 ASSESSMENT — FIBROSIS 4 INDEX: FIB4 SCORE: .8318903308077029698

## 2022-08-24 NOTE — ASSESSMENT & PLAN NOTE
Acute and ongoing. She states that starting Saturday she started having some pain while urinating. On Sunday she noticed some blood in her urine. She states that she did start increasing her water intake hoping to flush out any infection. She states that she continues to have a weird pressure and continues to have urgency and frequency. She is wanting to be tested for a UTI today. She states that she has had a history of UTI's in the past.

## 2022-08-24 NOTE — PROGRESS NOTES
Subjective  Chief Complaint  Dysuria    History of Present Illness  Mckenzie Hitchcock is a 66 y.o. female. This established patient is here today to discuss dysuria.    Dysuria  Acute and ongoing. She states that starting Saturday she started having some pain while urinating. On Sunday she noticed some blood in her urine. She states that she did start increasing her water intake hoping to flush out any infection. She states that she continues to have a weird pressure and continues to have urgency and frequency. She is wanting to be tested for a UTI today. She states that she has had a history of UTI's in the past.    Past Medical History    Allergies: Patient has no known allergies.  Past Medical History:   Diagnosis Date    Encounter for screening mammogram for breast cancer 3/15/2020    Need for vaccination 1/25/2018    Screening for colon cancer 3/15/2020    Screening for colorectal cancer 4/16/2019     Past Surgical History:   Procedure Laterality Date    ORIF, WRIST Left 03/2017    FOOT SURGERY Right 80's    TONSILLECTOMY  60's     Current Outpatient Medications Ordered in Epic   Medication Sig Dispense Refill    Coenzyme Q10 (CO Q 10 PO) Take  by mouth.      sulfamethoxazole-trimethoprim (BACTRIM DS) 800-160 MG tablet Take 1 Tablet by mouth 2 times a day for 3 days. 6 Tablet 0    aspirin (ASA) 81 MG Chew Tab chewable tablet Chew 1 Tablet.      Calcium Carb-Cholecalciferol (CALCIUM + VITAMIN D3 PO) Take  by mouth.      latanoprost (XALATAN) 0.005 % Solution Place 1 Drop in both eyes every evening.       No current Epic-ordered facility-administered medications on file.     Family History:    Family History   Problem Relation Age of Onset    Heart Attack Mother 76    Kidney Disease Mother     Lung Cancer Father 56    Alcohol/Drug Father         Alcoholic    Other Sister         Low BP    Other Brother         Back and joint issues    Lung Disease Maternal Grandmother     No Known Problems Maternal  "Grandfather     No Known Problems Paternal Grandmother     Other Paternal Grandfather         Suicide    Alcohol/Drug Paternal Grandfather     Hypertension Sister     No Known Problems Son     No Known Problems Son       Personal/Social History:    Social History     Tobacco Use    Smoking status: Never    Smokeless tobacco: Never   Vaping Use    Vaping Use: Never used   Substance Use Topics    Alcohol use: No    Drug use: No     Social History     Social History Narrative    Not on file      Review of Systems:   General: Negative for fever/chills and unexpected weight change.   Respiratory:  Negative for cough and dyspnea.    Cardiovascular:  Negative for chest pain and palpitations.  Genitourinary:  Positive for dysuria and hematuria.   Musculoskeletal:  Negative for myalgias.   Skin:  Negative for rash.     Objective  Physical Exam:   /72 (BP Location: Left arm, Patient Position: Sitting, BP Cuff Size: Large adult)   Pulse 80   Temp 36.8 °C (98.2 °F) (Temporal)   Resp 20   Ht 1.778 m (5' 10\")   Wt 94.9 kg (209 lb 4 oz)   SpO2 98%  Body mass index is 30.02 kg/m².  General:  Alert and oriented.  Well appearing.  NAD  Neck: Supple without JVD. No lymphadenopathy.  Pulmonary:  Normal effort.  Clear to ausculation without rales, ronchi, or wheezing.  Cardiovascular:  Regular rate and rhythm without murmur, rubs or gallop.   Skin:  Warm and dry.  No obvious lesions.  Musculoskeletal:  No extremity cyanosis, clubbing, or edema.      Assessment/Plan  1. Dysuria  2. Acute cystitis with hematuria  Acute and ongoing.  Discussed with her that her UA does show a UTI and that she needs to be treated with an antibiotic, she verbalized understanding.  Discussed Bactrim risks, benefits and side effects, she verbalized understanding.  Take Bactrim 800-160 mg one tablet twice a day for 3 days.  Educated to take the entire course of antibiotics even if she starts to feel better, she verbalized understanding.  - POCT " Urinalysis  - sulfamethoxazole-trimethoprim (BACTRIM DS) 800-160 MG tablet; Take 1 Tablet by mouth 2 times a day for 3 days.  Dispense: 6 Tablet; Refill: 0  - URINE CULTURE(NEW); Future      Health Maintenance: Completed    Return if symptoms worsen or fail to improve.    I have placed the above orders and discussed them with an approved delegating provider.  The MA is performing the above orders under the direction of Dr. Connor Rosas MD/DO.    Please note that this dictation was created using voice recognition software. I have made every reasonable attempt to correct obvious errors, but I expect that there are errors of grammar and possibly content that I did not discover before finalizing the note.    CLAUDIA Rainey  [facility]

## 2022-08-26 LAB
BACTERIA UR CULT: ABNORMAL
BACTERIA UR CULT: ABNORMAL
SIGNIFICANT IND 70042: ABNORMAL
SITE SITE: ABNORMAL
SOURCE SOURCE: ABNORMAL

## 2022-10-08 SDOH — HEALTH STABILITY: PHYSICAL HEALTH: ON AVERAGE, HOW MANY DAYS PER WEEK DO YOU ENGAGE IN MODERATE TO STRENUOUS EXERCISE (LIKE A BRISK WALK)?: 3 DAYS

## 2022-10-08 SDOH — HEALTH STABILITY: PHYSICAL HEALTH: ON AVERAGE, HOW MANY MINUTES DO YOU ENGAGE IN EXERCISE AT THIS LEVEL?: 20 MIN

## 2022-10-08 SDOH — ECONOMIC STABILITY: INCOME INSECURITY: HOW HARD IS IT FOR YOU TO PAY FOR THE VERY BASICS LIKE FOOD, HOUSING, MEDICAL CARE, AND HEATING?: NOT HARD AT ALL

## 2022-10-08 SDOH — ECONOMIC STABILITY: INCOME INSECURITY: IN THE LAST 12 MONTHS, WAS THERE A TIME WHEN YOU WERE NOT ABLE TO PAY THE MORTGAGE OR RENT ON TIME?: NO

## 2022-10-08 SDOH — ECONOMIC STABILITY: FOOD INSECURITY: WITHIN THE PAST 12 MONTHS, YOU WORRIED THAT YOUR FOOD WOULD RUN OUT BEFORE YOU GOT MONEY TO BUY MORE.: NEVER TRUE

## 2022-10-08 SDOH — ECONOMIC STABILITY: HOUSING INSECURITY: IN THE LAST 12 MONTHS, HOW MANY PLACES HAVE YOU LIVED?: 1

## 2022-10-08 SDOH — ECONOMIC STABILITY: FOOD INSECURITY: WITHIN THE PAST 12 MONTHS, THE FOOD YOU BOUGHT JUST DIDN'T LAST AND YOU DIDN'T HAVE MONEY TO GET MORE.: NEVER TRUE

## 2022-10-08 SDOH — HEALTH STABILITY: MENTAL HEALTH
STRESS IS WHEN SOMEONE FEELS TENSE, NERVOUS, ANXIOUS, OR CAN'T SLEEP AT NIGHT BECAUSE THEIR MIND IS TROUBLED. HOW STRESSED ARE YOU?: ONLY A LITTLE

## 2022-10-08 ASSESSMENT — SOCIAL DETERMINANTS OF HEALTH (SDOH)
IN A TYPICAL WEEK, HOW MANY TIMES DO YOU TALK ON THE PHONE WITH FAMILY, FRIENDS, OR NEIGHBORS?: MORE THAN THREE TIMES A WEEK
HOW OFTEN DO YOU GET TOGETHER WITH FRIENDS OR RELATIVES?: MORE THAN THREE TIMES A WEEK
IN A TYPICAL WEEK, HOW MANY TIMES DO YOU TALK ON THE PHONE WITH FAMILY, FRIENDS, OR NEIGHBORS?: MORE THAN THREE TIMES A WEEK
HOW HARD IS IT FOR YOU TO PAY FOR THE VERY BASICS LIKE FOOD, HOUSING, MEDICAL CARE, AND HEATING?: NOT HARD AT ALL
HOW OFTEN DO YOU ATTEND CHURCH OR RELIGIOUS SERVICES?: NEVER
DO YOU BELONG TO ANY CLUBS OR ORGANIZATIONS SUCH AS CHURCH GROUPS UNIONS, FRATERNAL OR ATHLETIC GROUPS, OR SCHOOL GROUPS?: NO
HOW OFTEN DO YOU ATTEND CHURCH OR RELIGIOUS SERVICES?: NEVER
HOW MANY DRINKS CONTAINING ALCOHOL DO YOU HAVE ON A TYPICAL DAY WHEN YOU ARE DRINKING: PATIENT DOES NOT DRINK
HOW OFTEN DO YOU ATTENT MEETINGS OF THE CLUB OR ORGANIZATION YOU BELONG TO?: NEVER
HOW OFTEN DO YOU HAVE A DRINK CONTAINING ALCOHOL: NEVER
WITHIN THE PAST 12 MONTHS, YOU WORRIED THAT YOUR FOOD WOULD RUN OUT BEFORE YOU GOT THE MONEY TO BUY MORE: NEVER TRUE
HOW OFTEN DO YOU ATTENT MEETINGS OF THE CLUB OR ORGANIZATION YOU BELONG TO?: NEVER
DO YOU BELONG TO ANY CLUBS OR ORGANIZATIONS SUCH AS CHURCH GROUPS UNIONS, FRATERNAL OR ATHLETIC GROUPS, OR SCHOOL GROUPS?: NO
HOW OFTEN DO YOU HAVE SIX OR MORE DRINKS ON ONE OCCASION: NEVER
HOW OFTEN DO YOU GET TOGETHER WITH FRIENDS OR RELATIVES?: MORE THAN THREE TIMES A WEEK

## 2022-10-08 ASSESSMENT — LIFESTYLE VARIABLES
HOW OFTEN DO YOU HAVE A DRINK CONTAINING ALCOHOL: NEVER
HOW OFTEN DO YOU HAVE SIX OR MORE DRINKS ON ONE OCCASION: NEVER
AUDIT-C TOTAL SCORE: 0
SKIP TO QUESTIONS 9-10: 1
HOW MANY STANDARD DRINKS CONTAINING ALCOHOL DO YOU HAVE ON A TYPICAL DAY: PATIENT DOES NOT DRINK

## 2022-10-11 ENCOUNTER — OFFICE VISIT (OUTPATIENT)
Dept: MEDICAL GROUP | Facility: PHYSICIAN GROUP | Age: 67
End: 2022-10-11
Payer: MEDICARE

## 2022-10-11 VITALS
TEMPERATURE: 97.8 F | WEIGHT: 212.13 LBS | DIASTOLIC BLOOD PRESSURE: 76 MMHG | HEIGHT: 70 IN | BODY MASS INDEX: 30.37 KG/M2 | RESPIRATION RATE: 18 BRPM | HEART RATE: 84 BPM | SYSTOLIC BLOOD PRESSURE: 124 MMHG | OXYGEN SATURATION: 97 %

## 2022-10-11 DIAGNOSIS — Z23 NEED FOR VACCINATION: ICD-10-CM

## 2022-10-11 DIAGNOSIS — Z00.00 MEDICARE ANNUAL WELLNESS VISIT, SUBSEQUENT: Primary | ICD-10-CM

## 2022-10-11 DIAGNOSIS — E66.9 OBESITY (BMI 30.0-34.9): ICD-10-CM

## 2022-10-11 PROBLEM — E66.811 OBESITY (BMI 30.0-34.9): Status: ACTIVE | Noted: 2022-10-11

## 2022-10-11 PROCEDURE — 90662 IIV NO PRSV INCREASED AG IM: CPT | Performed by: NURSE PRACTITIONER

## 2022-10-11 PROCEDURE — G0439 PPPS, SUBSEQ VISIT: HCPCS | Performed by: NURSE PRACTITIONER

## 2022-10-11 PROCEDURE — G0008 ADMIN INFLUENZA VIRUS VAC: HCPCS | Performed by: NURSE PRACTITIONER

## 2022-10-11 ASSESSMENT — ACTIVITIES OF DAILY LIVING (ADL): BATHING_REQUIRES_ASSISTANCE: 0

## 2022-10-11 ASSESSMENT — ENCOUNTER SYMPTOMS: GENERAL WELL-BEING: GOOD

## 2022-10-11 ASSESSMENT — PATIENT HEALTH QUESTIONNAIRE - PHQ9: CLINICAL INTERPRETATION OF PHQ2 SCORE: 0

## 2022-10-11 ASSESSMENT — FIBROSIS 4 INDEX: FIB4 SCORE: .8318903308077029698

## 2022-10-11 NOTE — PROGRESS NOTES
Chief Complaint   Patient presents with    Annual Wellness Visit       HPI:  Mckenzie Hitchcock is a 66 y.o. here for Medicare Annual Wellness Visit     Obesity (BMI 30.0-34.9)  Chronic and ongoing. She does go on walks 2-3 times a week and does watch what she eats.    Patient Active Problem List    Diagnosis Date Noted    Obesity (BMI 30.0-34.9) 10/11/2022    Dysuria 08/24/2022    Abnormal CBC 06/24/2021    Dysphagia 06/24/2021    Dry throat 12/30/2020    Dyslipidemia 12/30/2020    Vitamin D deficiency 12/30/2020    Postmenopausal 03/15/2020    Elevated glucose 04/16/2019    Unspecified glaucoma 02/07/2006       Current Outpatient Medications   Medication Sig Dispense Refill    Coenzyme Q10 (CO Q 10 PO) Take  by mouth.      aspirin (ASA) 81 MG Chew Tab chewable tablet Chew 1 Tablet.      Calcium Carb-Cholecalciferol (CALCIUM + VITAMIN D3 PO) Take  by mouth.      latanoprost (XALATAN) 0.005 % Solution Place 1 Drop in both eyes every evening.       No current facility-administered medications for this visit.          Current supplements as per medication list.     Allergies: Patient has no known allergies.    Current social contact/activities: Walks 2-3 days a week     She  reports that she has never smoked. She has never used smokeless tobacco. She reports that she does not drink alcohol and does not use drugs.  Counseling given: Not Answered      DPA/Advanced Directive:  Patient has Advanced Directive, but it is not on file. Instructed to bring in a copy to scan into their chart.    ROS:    Gait: Uses no assistive device  Ostomy: No  Other tubes: No  Amputations: No  Chronic oxygen use: No  Last eye exam: 09/22  Wears hearing aids: No   : Reports urinary leakage during the last 6 months that has not interfered at all with their daily activities or sleep.    Screening:    Depression Screening  Little interest or pleasure in doing things?  0 - not at all  Feeling down, depressed , or hopeless? 0 - not at  all  Patient Health Questionnaire Score: 0     If depressive symptoms identified deferred to follow up visit unless specifically addressed in assessment and plan.    Interpretation of PHQ-9 Total Score   Score Severity   1-4 No Depression   5-9 Mild Depression   10-14 Moderate Depression   15-19 Moderately Severe Depression   20-27 Severe Depression    Screening for Cognitive Impairment  Three Minute Recall (daughter, heaven, mountain) 3/3    Ishan clock face with all 12 numbers and set the hands to show 10 past 11.  Yes    Cognitive concerns identified deferred for follow up unless specifically addressed in assessment and plan.    Fall Risk Assessment  Has the patient had two or more falls in the last year or any fall with injury in the last year?       Safety Assessment  Throw rugs on floor.  No  Handrails on all stairs.  No  Good lighting in all hallways.  Yes  Difficulty hearing.  No  Patient counseled about all safety risks that were identified.    Functional Assessment ADLs  Are there any barriers preventing you from cooking for yourself or meeting nutritional needs?  No.    Are there any barriers preventing you from driving safely or obtaining transportation?  No.    Are there any barriers preventing you from using a telephone or calling for help?  No.    Are there any barriers preventing you from shopping?  No.    Are there any barriers preventing you from taking care of your own finances?  No.    Are there any barriers preventing you from managing your medications?  No.    Are there any barriers preventing you from showering, bathing or dressing yourself?  No.    Are you currently engaging in any exercise or physical activity?  Yes.  Walks 2-3 days a week  What is your perception of your health?  Good.    Advance Care Planning  Do you have an Advance Directive, Living Will, Durable Power of , or POLST? Yes.    Health Maintenance Summary            COLORECTAL CANCER SCREENING (COLOGUARD STOOL DNA -  Every 3 Years) Next due on 3/25/2023      03/25/2020  COLOGUARD COLON CANCER SCREENING    07/01/2019  OCCULT BLOOD FECES IMMUNOASSAY (FIT)              MAMMOGRAM (Yearly) Next due on 8/24/2023 08/24/2022  MA-SCREENING MAMMO BILAT W/TOMOSYNTHESIS W/CAD    08/04/2021  MA-SCREENING MAMMO BILAT W/TOMOSYNTHESIS W/CAD    06/23/2020  MA-SCREENING MAMMO BILAT W/TOMOSYNTHESIS W/CAD    02/01/2019  MA-SCREENING MAMMO BILAT W/TOMOSYNTHESIS W/CAD    02/05/2018  FOREIGN FILM MAMMOGRAPHY    Only the first 5 history entries have been loaded, but more history exists.              BONE DENSITY (Every 5 Years) Next due on 6/23/2025 06/23/2020  DS-BONE DENSITY STUDY (DEXA)              IMM DTaP/Tdap/Td Vaccine (3 - Td or Tdap) Next due on 1/25/2028 01/25/2018  Imm Admin: Tdap Vaccine    09/15/2009  Imm Admin: Tdap Vaccine              HEPATITIS C SCREENING  Completed      08/06/2021  HEPATITIS PANEL ACUTE(4 COMPONENTS)    03/18/2020  HEP C VIRUS ANTIBODY              IMM PNEUMOCOCCAL VACCINE: 65+ Years (Series Information) Completed      08/06/2021  Imm Admin: Pneumococcal polysaccharide vaccine (PPSV-23)    04/16/2019  Imm Admin: Pneumococcal Conjugate Vaccine (Prevnar/PCV-13)              IMM ZOSTER VACCINES (Series Information) Completed      10/29/2021  Imm Admin: Zoster Vaccine Recombinant (RZV) (SHINGRIX)    09/02/2021  Imm Admin: Zoster Vaccine Recombinant (RZV) (SHINGRIX)    09/01/2016  Imm Admin: Zoster Vaccine Live (ZVL) (Zostavax) - HISTORICAL DATA              COVID-19 Vaccine (Series Information) Completed      05/19/2022  Imm Admin: PFIZER HAYWARD CAP SARS-COV-2 VACCINATION (12+)    11/29/2021  Imm Admin: PFIZER PURPLE CAP SARS-COV-2 VACCINATION (12+)    04/02/2021  Imm Admin: PFIZER PURPLE CAP SARS-COV-2 VACCINATION (12+)    03/11/2021  Imm Admin: PFIZER PURPLE CAP SARS-COV-2 VACCINATION (12+)              IMM INFLUENZA (Series Information) Completed      10/11/2022  Imm Admin: Influenza Vaccine Adult HD     10/04/2021  Imm Admin: Influenza Vaccine Adult HD    10/12/2020  Imm Admin: Influenza Vaccine Adult HD    10/23/2019  Imm Admin: Influenza Vaccine Adult HD    12/12/2018  Imm Admin: Influenza Vaccine Quad Inj (Pf)    Only the first 5 history entries have been loaded, but more history exists.              IMM MENINGOCOCCAL ACWY VACCINE (Series Information) Aged Out      No completion history exists for this topic.              Discontinued - PAP SMEAR  Discontinued      No completion history exists for this topic.              Discontinued - IMM HEP B VACCINE  Discontinued      No completion history exists for this topic.                    Patient Care Team:  PIERCE Ellsion as PCP - General (Nurse Practitioner Gerontology)  Gentry Farley M.D. as Consulting Physician (Ophthalmology)        Social History     Tobacco Use    Smoking status: Never    Smokeless tobacco: Never   Vaping Use    Vaping Use: Never used   Substance Use Topics    Alcohol use: No    Drug use: No     Family History   Problem Relation Age of Onset    Heart Attack Mother 76    Kidney Disease Mother     Lung Cancer Father 56    Alcohol/Drug Father         Alcoholic    Other Sister         Low BP    Other Brother         Back and joint issues    Lung Disease Maternal Grandmother     No Known Problems Maternal Grandfather     No Known Problems Paternal Grandmother     Other Paternal Grandfather         Suicide    Alcohol/Drug Paternal Grandfather     Hypertension Sister     No Known Problems Son     No Known Problems Son      She  has a past medical history of BMI 31.0-31.9,adult (1/25/2018), Encounter for screening mammogram for breast cancer (3/15/2020), Need for vaccination (1/25/2018), Screening for colon cancer (3/15/2020), and Screening for colorectal cancer (4/16/2019).   Past Surgical History:   Procedure Laterality Date    ORIF, WRIST Left 03/2017    FOOT SURGERY Right 80's    TONSILLECTOMY  60's       Exam:   /76 (BP  "Location: Left arm, Patient Position: Sitting, BP Cuff Size: Adult)   Pulse 84   Temp 36.6 °C (97.8 °F) (Temporal)   Resp 18   Ht 1.778 m (5' 10\")   Wt 96.2 kg (212 lb 2 oz)   SpO2 97%  Body mass index is 30.44 kg/m².    Hearing good.    Dentition good  Alert, oriented in no acute distress.  Eye contact is good, speech goal directed, affect calm    Assessment and Plan. The following treatment and monitoring plan is recommended:    1. Medicare annual wellness visit, subsequent    2. Obesity (BMI 30.0-34.9)    3. Need for vaccination  - Influenza Vaccine, High Dose (65+ Only)      Services suggested: No services needed at this time  Health Care Screening: Age-appropriate preventive services recommended by USPTF and ACIP covered by Medicare were discussed today. Services ordered if indicated and agreed upon by the patient.  Referrals offered: Community-based lifestyle interventions to reduce health risks and promote self-management and wellness, fall prevention, nutrition, physical activity, tobacco-use cessation, weight loss, and mental health services as per orders if indicated.    Discussion today about general wellness and lifestyle habits:    Prevent falls and reduce trip hazards; Cautioned about securing or removing rugs.  Have a working fire alarm and carbon monoxide detector;   Engage in regular physical activity and social activities       I have placed the above orders and discussed them with an approved delegating provider.  The MA is performing the below orders under the direction of Dr. Connor Rosas MD/DO.     Follow-up: Return in about 1 year (around 10/11/2023), or if symptoms worsen or fail to improve.    "

## 2022-11-07 ENCOUNTER — PATIENT MESSAGE (OUTPATIENT)
Dept: HEALTH INFORMATION MANAGEMENT | Facility: OTHER | Age: 67
End: 2022-11-07

## 2023-05-17 ENCOUNTER — TELEPHONE (OUTPATIENT)
Dept: MEDICAL GROUP | Facility: PHYSICIAN GROUP | Age: 68
End: 2023-05-17
Payer: MEDICARE

## 2023-05-17 NOTE — TELEPHONE ENCOUNTER
Caller Name: Mckenzie Hitchcock  Call Back Number: [phone] or [phone]    How would the patient prefer to be contacted with a response: Phone call do NOT leave a detailed message    2. Patient is requesting lab results be discussed from her prescreening from when she went to donate blood. She was positive for HEP B Anti HBC SAG. Patient is requesting more information in regards to what these results mean.      3.  No results available in chart    Called patient 5/17/23 and left a voicemail suggesting she make an appointment to further discuss these results.

## 2023-05-18 ENCOUNTER — HOSPITAL ENCOUNTER (OUTPATIENT)
Dept: LAB | Facility: MEDICAL CENTER | Age: 68
End: 2023-05-18
Attending: NURSE PRACTITIONER
Payer: MEDICARE

## 2023-05-18 ENCOUNTER — OFFICE VISIT (OUTPATIENT)
Dept: MEDICAL GROUP | Facility: PHYSICIAN GROUP | Age: 68
End: 2023-05-18
Payer: MEDICARE

## 2023-05-18 VITALS
WEIGHT: 205 LBS | OXYGEN SATURATION: 99 % | BODY MASS INDEX: 29.35 KG/M2 | HEART RATE: 70 BPM | SYSTOLIC BLOOD PRESSURE: 134 MMHG | TEMPERATURE: 98.2 F | HEIGHT: 70 IN | DIASTOLIC BLOOD PRESSURE: 80 MMHG | RESPIRATION RATE: 16 BRPM

## 2023-05-18 DIAGNOSIS — R76.8 HEPATITIS B CORE ANTIBODY POSITIVE: ICD-10-CM

## 2023-05-18 DIAGNOSIS — Z12.11 COLON CANCER SCREENING: ICD-10-CM

## 2023-05-18 PROCEDURE — 36415 COLL VENOUS BLD VENIPUNCTURE: CPT

## 2023-05-18 PROCEDURE — 99213 OFFICE O/P EST LOW 20 MIN: CPT | Performed by: NURSE PRACTITIONER

## 2023-05-18 PROCEDURE — 3075F SYST BP GE 130 - 139MM HG: CPT | Performed by: NURSE PRACTITIONER

## 2023-05-18 PROCEDURE — 3079F DIAST BP 80-89 MM HG: CPT | Performed by: NURSE PRACTITIONER

## 2023-05-18 PROCEDURE — 87517 HEPATITIS B DNA QUANT: CPT

## 2023-05-18 RX ORDER — UBIDECARENONE 100 %
POWDER (GRAM) MISCELLANEOUS
COMMUNITY
End: 2023-11-02

## 2023-05-18 ASSESSMENT — FIBROSIS 4 INDEX: FIB4 SCORE: 0.84

## 2023-05-18 ASSESSMENT — PATIENT HEALTH QUESTIONNAIRE - PHQ9: CLINICAL INTERPRETATION OF PHQ2 SCORE: 0

## 2023-05-18 NOTE — PROGRESS NOTES
Subjective  Chief Complaint  Lab Results    History of Present Illness  Mckenzie Hitchcock is a 67 y.o. female. This established patient is here today to discuss recent lab results when she went to donate blood.    Hepatitis B core antibody positive  New diagnosis. She recently went to donate blood and she got her results back that showed her hepatitis b antibody was positive. She has been donating blood for many years now and this is the first time this test came back positive. She can not donate blood until she gets further lab work done.    Past Medical History    Allergies: Patient has no known allergies.  Past Medical History:   Diagnosis Date    BMI 31.0-31.9,adult 1/25/2018    Encounter for screening mammogram for breast cancer 3/15/2020    Need for vaccination 1/25/2018    Screening for colon cancer 3/15/2020    Screening for colorectal cancer 4/16/2019     Past Surgical History:   Procedure Laterality Date    ORIF, WRIST Left 03/2017    FOOT SURGERY Right 80's    TONSILLECTOMY  60's     Current Outpatient Medications Ordered in Epic   Medication Sig Dispense Refill    Coenzyme Q10 Powder Take  by mouth.      Coenzyme Q10 (CO Q 10 PO) Take  by mouth.      aspirin (ASA) 81 MG Chew Tab chewable tablet Chew 1 Tablet.      Calcium Carb-Cholecalciferol (CALCIUM + VITAMIN D3 PO) Take  by mouth.      latanoprost (XALATAN) 0.005 % Solution Place 1 Drop in both eyes every evening.       No current Epic-ordered facility-administered medications on file.     Family History:    Family History   Problem Relation Age of Onset    Heart Attack Mother 76    Kidney Disease Mother     Lung Cancer Father 56    Alcohol/Drug Father         Alcoholic    Other Sister         Low BP    Other Brother         Back and joint issues    Lung Disease Maternal Grandmother     No Known Problems Maternal Grandfather     No Known Problems Paternal Grandmother     Other Paternal Grandfather         Suicide    Alcohol/Drug Paternal  "Grandfather     Hypertension Sister     No Known Problems Son     No Known Problems Son       Personal/Social History:    Social History     Tobacco Use    Smoking status: Never    Smokeless tobacco: Never   Vaping Use    Vaping Use: Never used   Substance Use Topics    Alcohol use: No    Drug use: No     Social History     Social History Narrative    Not on file      Review of Systems:   General: Negative for fever/chills and unexpected weight change.   Respiratory:  Negative for cough and dyspnea.    Cardiovascular:  Negative for chest pain and palpitations.  Musculoskeletal:  Negative for myalgias.   Skin:  Negative for rash.     Objective  Physical Exam:   /80   Pulse 70   Temp 36.8 °C (98.2 °F) (Temporal)   Resp 16   Ht 1.778 m (5' 10\")   Wt 93 kg (205 lb)   SpO2 99%  Body mass index is 29.41 kg/m².  General:  Alert and oriented.  Well appearing.  NAD  Neck: Supple without JVD. No lymphadenopathy.  Skin:  Warm and dry.  No obvious lesions.  Musculoskeletal:  No extremity cyanosis, clubbing, or edema.      Assessment/Plan  1. Hepatitis B core antibody positive  New diagnosis.  Discussed with her that the result the blood facility got could have been a false positive and that she should have further lab work done, she is agreeable.  - HBV PCR Quant; Future    2. Colon cancer screening  - COLOGUARD (FIT DNA)      Health Maintenance: Completed    Return if symptoms worsen or fail to improve.    Discussed that the patient carries some responsibility in management of their health care.    Please note that this dictation was created using voice recognition software. I have made every reasonable attempt to correct obvious errors, but I expect that there are errors of grammar and possibly content that I did not discover before finalizing the note.    CLAUDIA Rainey  [facility]  "

## 2023-05-18 NOTE — ASSESSMENT & PLAN NOTE
New diagnosis. She recently went to donate blood and she got her results back that showed her hepatitis b antibody was positive. She has been donating blood for many years now and this is the first time this test came back positive. She can not donate blood until she gets further lab work done.

## 2023-05-19 LAB
HBV DNA SERPL NAA+PROBE-ACNC: NOT DETECTED IU/ML
HBV DNA SERPL NAA+PROBE-LOG IU: NOT DETECTED LOG IU/ML
HBV DNA SERPL QL NAA+PROBE: NOT DETECTED

## 2023-10-29 SDOH — HEALTH STABILITY: PHYSICAL HEALTH: ON AVERAGE, HOW MANY MINUTES DO YOU ENGAGE IN EXERCISE AT THIS LEVEL?: 20 MIN

## 2023-10-29 SDOH — ECONOMIC STABILITY: INCOME INSECURITY: IN THE LAST 12 MONTHS, WAS THERE A TIME WHEN YOU WERE NOT ABLE TO PAY THE MORTGAGE OR RENT ON TIME?: NO

## 2023-10-29 SDOH — ECONOMIC STABILITY: HOUSING INSECURITY: IN THE LAST 12 MONTHS, HOW MANY PLACES HAVE YOU LIVED?: 1

## 2023-10-29 SDOH — ECONOMIC STABILITY: FOOD INSECURITY: WITHIN THE PAST 12 MONTHS, THE FOOD YOU BOUGHT JUST DIDN'T LAST AND YOU DIDN'T HAVE MONEY TO GET MORE.: NEVER TRUE

## 2023-10-29 SDOH — ECONOMIC STABILITY: INCOME INSECURITY: HOW HARD IS IT FOR YOU TO PAY FOR THE VERY BASICS LIKE FOOD, HOUSING, MEDICAL CARE, AND HEATING?: NOT HARD AT ALL

## 2023-10-29 SDOH — HEALTH STABILITY: PHYSICAL HEALTH: ON AVERAGE, HOW MANY DAYS PER WEEK DO YOU ENGAGE IN MODERATE TO STRENUOUS EXERCISE (LIKE A BRISK WALK)?: 4 DAYS

## 2023-10-29 SDOH — ECONOMIC STABILITY: TRANSPORTATION INSECURITY

## 2023-10-29 ASSESSMENT — SOCIAL DETERMINANTS OF HEALTH (SDOH)
HOW HARD IS IT FOR YOU TO PAY FOR THE VERY BASICS LIKE FOOD, HOUSING, MEDICAL CARE, AND HEATING?: NOT HARD AT ALL
HOW OFTEN DO YOU ATTENT MEETINGS OF THE CLUB OR ORGANIZATION YOU BELONG TO?: NEVER
HOW OFTEN DO YOU GET TOGETHER WITH FRIENDS OR RELATIVES?: MORE THAN THREE TIMES A WEEK
IN A TYPICAL WEEK, HOW MANY TIMES DO YOU TALK ON THE PHONE WITH FAMILY, FRIENDS, OR NEIGHBORS?: MORE THAN THREE TIMES A WEEK
HOW OFTEN DO YOU GET TOGETHER WITH FRIENDS OR RELATIVES?: MORE THAN THREE TIMES A WEEK
HOW OFTEN DO YOU HAVE A DRINK CONTAINING ALCOHOL: NEVER
IN A TYPICAL WEEK, HOW MANY TIMES DO YOU TALK ON THE PHONE WITH FAMILY, FRIENDS, OR NEIGHBORS?: MORE THAN THREE TIMES A WEEK
HOW OFTEN DO YOU HAVE SIX OR MORE DRINKS ON ONE OCCASION: NEVER
DO YOU BELONG TO ANY CLUBS OR ORGANIZATIONS SUCH AS CHURCH GROUPS UNIONS, FRATERNAL OR ATHLETIC GROUPS, OR SCHOOL GROUPS?: NO
HOW MANY DRINKS CONTAINING ALCOHOL DO YOU HAVE ON A TYPICAL DAY WHEN YOU ARE DRINKING: PATIENT DOES NOT DRINK
HOW OFTEN DO YOU ATTENT MEETINGS OF THE CLUB OR ORGANIZATION YOU BELONG TO?: NEVER
DO YOU BELONG TO ANY CLUBS OR ORGANIZATIONS SUCH AS CHURCH GROUPS UNIONS, FRATERNAL OR ATHLETIC GROUPS, OR SCHOOL GROUPS?: NO

## 2023-10-29 ASSESSMENT — LIFESTYLE VARIABLES
HOW MANY STANDARD DRINKS CONTAINING ALCOHOL DO YOU HAVE ON A TYPICAL DAY: PATIENT DOES NOT DRINK
SKIP TO QUESTIONS 9-10: 1
HOW OFTEN DO YOU HAVE SIX OR MORE DRINKS ON ONE OCCASION: NEVER
AUDIT-C TOTAL SCORE: 0
HOW OFTEN DO YOU HAVE A DRINK CONTAINING ALCOHOL: NEVER

## 2023-11-02 ENCOUNTER — OFFICE VISIT (OUTPATIENT)
Dept: MEDICAL GROUP | Facility: PHYSICIAN GROUP | Age: 68
End: 2023-11-02
Payer: MEDICARE

## 2023-11-02 VITALS
OXYGEN SATURATION: 98 % | RESPIRATION RATE: 12 BRPM | HEIGHT: 70 IN | DIASTOLIC BLOOD PRESSURE: 78 MMHG | BODY MASS INDEX: 29.75 KG/M2 | WEIGHT: 207.8 LBS | HEART RATE: 70 BPM | TEMPERATURE: 97.9 F | SYSTOLIC BLOOD PRESSURE: 126 MMHG

## 2023-11-02 DIAGNOSIS — E78.5 DYSLIPIDEMIA: ICD-10-CM

## 2023-11-02 DIAGNOSIS — R68.89 TEMPERATURE INTOLERANCE: ICD-10-CM

## 2023-11-02 DIAGNOSIS — E55.9 VITAMIN D DEFICIENCY: ICD-10-CM

## 2023-11-02 DIAGNOSIS — Z23 NEED FOR VACCINATION: ICD-10-CM

## 2023-11-02 DIAGNOSIS — R79.89 ABNORMAL CBC: ICD-10-CM

## 2023-11-02 DIAGNOSIS — R42 LIGHTHEADED: ICD-10-CM

## 2023-11-02 PROCEDURE — 99213 OFFICE O/P EST LOW 20 MIN: CPT | Mod: 25

## 2023-11-02 PROCEDURE — 3078F DIAST BP <80 MM HG: CPT

## 2023-11-02 PROCEDURE — 3074F SYST BP LT 130 MM HG: CPT

## 2023-11-02 PROCEDURE — G0008 ADMIN INFLUENZA VIRUS VAC: HCPCS

## 2023-11-02 PROCEDURE — 90662 IIV NO PRSV INCREASED AG IM: CPT

## 2023-11-02 ASSESSMENT — ENCOUNTER SYMPTOMS
WHEEZING: 0
HEADACHES: 0
CHILLS: 0
SHORTNESS OF BREATH: 0
PALPITATIONS: 0
TINGLING: 0
FEVER: 0
ABDOMINAL PAIN: 0
COUGH: 0
DIZZINESS: 0
BLOOD IN STOOL: 0

## 2023-11-02 ASSESSMENT — FIBROSIS 4 INDEX: FIB4 SCORE: 0.84

## 2023-11-02 NOTE — PROGRESS NOTES
Subjective:     Chief Complaint   Patient presents with    Establish Care     Mckenzie Hitchcock is a 67 y.o. female, who is here today to establish care and discuss chronic issues. Her prior PCP was CLAUDIA Rainey.    Problem   Temperature Intolerance    Chronic, ongoing.  Patient reports over the last year has noticed an increase intolerance to both heat and cold.  Patient reports also thinning of her skin as well as an increase in dryness.  Denies any constipation, hair loss.     Lightheaded    Chronic, intermittent.  Patient reports over the summer especially with the changes in heat or after she donates blood noticing she gets lightheaded.  It comes and goes and with rest goes away.  Patient is due for labs     Dyslipidemia    Chronic, uncontrolled, unknown.  Currently is not taking any cholesterol lowering medications.  The 10-year ASCVD risk score (Cierra SIMON, et al., 2019) is: 8.3%  She is following a low-cholesterol diet.  She is exercising regularly.  She is taking ASA daily.  She denies dizziness, claudication, or chest pain.  Due for updated lab work today.  Lab Results   Component Value Date/Time    CHOLSTRLTOT 218 (H) 07/14/2022 0802    TRIGLYCERIDE 161 (H) 07/14/2022 0802    HDL 61 07/14/2022 0802     (H) 07/14/2022 0802        Vitamin D Deficiency    Chronic, controlled.  Patient takes a vitamin D and calcium supplement.  Last labs in 7/2022 were normal.        Allergies: Patient has no known allergies.    Review of Systems   Constitutional:  Negative for chills and fever.   Respiratory:  Negative for cough, shortness of breath and wheezing.    Cardiovascular:  Negative for chest pain, palpitations and leg swelling.   Gastrointestinal:  Negative for abdominal pain and blood in stool.   Genitourinary:  Negative for hematuria.   Skin:  Negative for rash.   Neurological:  Negative for dizziness, tingling and headaches.     Health Maintenance: Deferred    Objective:     /78   Pulse  "70   Temp 36.6 °C (97.9 °F) (Temporal)   Resp 12   Ht 1.778 m (5' 10\")   Wt 94.3 kg (207 lb 12.8 oz)   SpO2 98%  Body mass index is 29.82 kg/m².    Physical Exam  Vitals reviewed.   Constitutional:       Appearance: Normal appearance.   Cardiovascular:      Rate and Rhythm: Normal rate and regular rhythm.      Pulses: Normal pulses.   Pulmonary:      Effort: Pulmonary effort is normal.      Breath sounds: Normal breath sounds.   Abdominal:      General: Abdomen is flat.      Palpations: Abdomen is soft.   Musculoskeletal:      Cervical back: Normal range of motion.   Skin:     General: Skin is warm and dry.   Neurological:      General: No focal deficit present.      Mental Status: She is alert and oriented to person, place, and time.   Psychiatric:         Mood and Affect: Mood normal.         Behavior: Behavior normal.        Assessment & Plan:     The following plan was discussed through shared decision making with the patient:    1. Dyslipidemia  Chronic, uncontrolled.  Patient's last lipid panel was elevated and has an ASCVD risk score of 8.3%.  Patient has been taking daily low-dose aspirin.  We will continue current regimen this time and check labs.  - Lipid Profile; Future    2. Temperature intolerance  Chronic, intermittent  Has noticed in the last year that she is a little more sensitive to changes in temperature.  Patient is unsure if is just with age or if something else is going on.  We will check labs  - TSH WITH REFLEX TO FT4; Future    3. Lightheaded  Chronic, intermittent  Patient has had a history of abnormal CBCs and donates blood every 8 weeks.  Has noticed this resolves within an hour of rest.  Encourage patient to drink plenty of fluids.  Consider checking blood pressure during these events and or journaling symptoms.  We will check labs  - CBC WITH DIFFERENTIAL; Future  - Comp Metabolic Panel; Future    4. Abnormal CBC  Chronic, controlled.  Patient reports donating blood every 8 weeks as " she has had elevated blood counts but reports not having the genetic component of  - CBC WITH DIFFERENTIAL; Future  - FERRITIN; Future  - IRON/TOTAL IRON BIND; Future    5. Vitamin D deficiency  Chronic, controlled.  Patient is currently on supplementation with a calcium supplement.  We will check labs to monitor proper supplementation regimen  - VITAMIN D,25 HYDROXY (DEFICIENCY); Future    6. Need for vaccination  - INFLUENZA VACCINE, HIGH DOSE (65+ ONLY)    Return in about 5 months (around 4/2/2024) for Medicare Wellness.         Please note that this dictation was created using voice recognition software. I have made every reasonable attempt to correct obvious errors, but I expect that there are errors of grammar and possibly content that I did not discover before finalizing the note.    CLAUDIA Angulo  [facility]  [facility]

## 2024-01-25 ENCOUNTER — HOSPITAL ENCOUNTER (OUTPATIENT)
Dept: LAB | Facility: MEDICAL CENTER | Age: 69
End: 2024-01-25
Payer: MEDICARE

## 2024-01-25 DIAGNOSIS — R68.89 TEMPERATURE INTOLERANCE: ICD-10-CM

## 2024-01-25 DIAGNOSIS — R79.89 ABNORMAL CBC: ICD-10-CM

## 2024-01-25 DIAGNOSIS — R42 LIGHTHEADED: ICD-10-CM

## 2024-01-25 DIAGNOSIS — E55.9 VITAMIN D DEFICIENCY: ICD-10-CM

## 2024-01-25 DIAGNOSIS — E78.5 DYSLIPIDEMIA: ICD-10-CM

## 2024-01-25 LAB
25(OH)D3 SERPL-MCNC: 55 NG/ML (ref 30–100)
ALBUMIN SERPL BCP-MCNC: 4.3 G/DL (ref 3.2–4.9)
ALBUMIN/GLOB SERPL: 1.3 G/DL
ALP SERPL-CCNC: 92 U/L (ref 30–99)
ALT SERPL-CCNC: 6 U/L (ref 2–50)
ANION GAP SERPL CALC-SCNC: 16 MMOL/L (ref 7–16)
AST SERPL-CCNC: 14 U/L (ref 12–45)
BASOPHILS # BLD AUTO: 0.9 % (ref 0–1.8)
BASOPHILS # BLD: 0.04 K/UL (ref 0–0.12)
BILIRUB SERPL-MCNC: 0.5 MG/DL (ref 0.1–1.5)
BUN SERPL-MCNC: 13 MG/DL (ref 8–22)
CALCIUM ALBUM COR SERPL-MCNC: 9 MG/DL (ref 8.5–10.5)
CALCIUM SERPL-MCNC: 9.2 MG/DL (ref 8.5–10.5)
CHLORIDE SERPL-SCNC: 106 MMOL/L (ref 96–112)
CHOLEST SERPL-MCNC: 187 MG/DL (ref 100–199)
CO2 SERPL-SCNC: 22 MMOL/L (ref 20–33)
CREAT SERPL-MCNC: 0.78 MG/DL (ref 0.5–1.4)
EOSINOPHIL # BLD AUTO: 0.1 K/UL (ref 0–0.51)
EOSINOPHIL NFR BLD: 2.2 % (ref 0–6.9)
ERYTHROCYTE [DISTWIDTH] IN BLOOD BY AUTOMATED COUNT: 45.1 FL (ref 35.9–50)
FASTING STATUS PATIENT QL REPORTED: NORMAL
FERRITIN SERPL-MCNC: 7.5 NG/ML (ref 10–291)
GFR SERPLBLD CREATININE-BSD FMLA CKD-EPI: 82 ML/MIN/1.73 M 2
GLOBULIN SER CALC-MCNC: 3.4 G/DL (ref 1.9–3.5)
GLUCOSE SERPL-MCNC: 94 MG/DL (ref 65–99)
HCT VFR BLD AUTO: 39.4 % (ref 37–47)
HDLC SERPL-MCNC: 56 MG/DL
HGB BLD-MCNC: 12.1 G/DL (ref 12–16)
IMM GRANULOCYTES # BLD AUTO: 0.01 K/UL (ref 0–0.11)
IMM GRANULOCYTES NFR BLD AUTO: 0.2 % (ref 0–0.9)
IRON SATN MFR SERPL: 7 % (ref 15–55)
IRON SERPL-MCNC: 28 UG/DL (ref 40–170)
LDLC SERPL CALC-MCNC: 103 MG/DL
LYMPHOCYTES # BLD AUTO: 1.61 K/UL (ref 1–4.8)
LYMPHOCYTES NFR BLD: 34.7 % (ref 22–41)
MCH RBC QN AUTO: 24.3 PG (ref 27–33)
MCHC RBC AUTO-ENTMCNC: 30.7 G/DL (ref 32.2–35.5)
MCV RBC AUTO: 79.3 FL (ref 81.4–97.8)
MONOCYTES # BLD AUTO: 0.35 K/UL (ref 0–0.85)
MONOCYTES NFR BLD AUTO: 7.5 % (ref 0–13.4)
NEUTROPHILS # BLD AUTO: 2.53 K/UL (ref 1.82–7.42)
NEUTROPHILS NFR BLD: 54.5 % (ref 44–72)
NRBC # BLD AUTO: 0 K/UL
NRBC BLD-RTO: 0 /100 WBC (ref 0–0.2)
PLATELET # BLD AUTO: 322 K/UL (ref 164–446)
PMV BLD AUTO: 9.5 FL (ref 9–12.9)
POTASSIUM SERPL-SCNC: 4.3 MMOL/L (ref 3.6–5.5)
PROT SERPL-MCNC: 7.7 G/DL (ref 6–8.2)
RBC # BLD AUTO: 4.97 M/UL (ref 4.2–5.4)
SODIUM SERPL-SCNC: 144 MMOL/L (ref 135–145)
TIBC SERPL-MCNC: 393 UG/DL (ref 250–450)
TRIGL SERPL-MCNC: 140 MG/DL (ref 0–149)
TSH SERPL DL<=0.005 MIU/L-ACNC: 2.15 UIU/ML (ref 0.38–5.33)
UIBC SERPL-MCNC: 365 UG/DL (ref 110–370)
WBC # BLD AUTO: 4.6 K/UL (ref 4.8–10.8)

## 2024-01-25 PROCEDURE — 83550 IRON BINDING TEST: CPT

## 2024-01-25 PROCEDURE — 84443 ASSAY THYROID STIM HORMONE: CPT

## 2024-01-25 PROCEDURE — 85025 COMPLETE CBC W/AUTO DIFF WBC: CPT

## 2024-01-25 PROCEDURE — 83540 ASSAY OF IRON: CPT

## 2024-01-25 PROCEDURE — 80061 LIPID PANEL: CPT

## 2024-01-25 PROCEDURE — 82306 VITAMIN D 25 HYDROXY: CPT

## 2024-01-25 PROCEDURE — 36415 COLL VENOUS BLD VENIPUNCTURE: CPT

## 2024-01-25 PROCEDURE — 80053 COMPREHEN METABOLIC PANEL: CPT

## 2024-01-25 PROCEDURE — 82728 ASSAY OF FERRITIN: CPT

## 2024-01-26 DIAGNOSIS — R79.89 ABNORMAL CBC: ICD-10-CM

## 2024-01-26 DIAGNOSIS — D50.8 OTHER IRON DEFICIENCY ANEMIA: ICD-10-CM

## 2024-01-26 RX ORDER — FERROUS SULFATE 325(65) MG
325 TABLET ORAL
Qty: 36 TABLET | Refills: 0 | Status: SHIPPED | OUTPATIENT
Start: 2024-01-26 | End: 2024-04-25

## 2024-04-02 ENCOUNTER — APPOINTMENT (OUTPATIENT)
Dept: MEDICAL GROUP | Facility: PHYSICIAN GROUP | Age: 69
End: 2024-04-02
Payer: MEDICARE

## 2024-04-02 VITALS
BODY MASS INDEX: 30.49 KG/M2 | TEMPERATURE: 97 F | OXYGEN SATURATION: 98 % | RESPIRATION RATE: 20 BRPM | DIASTOLIC BLOOD PRESSURE: 72 MMHG | SYSTOLIC BLOOD PRESSURE: 120 MMHG | WEIGHT: 213 LBS | HEART RATE: 76 BPM | HEIGHT: 70 IN

## 2024-04-02 DIAGNOSIS — Z00.00 ENCOUNTER FOR MEDICARE ANNUAL WELLNESS EXAM: ICD-10-CM

## 2024-04-02 PROCEDURE — 3078F DIAST BP <80 MM HG: CPT

## 2024-04-02 PROCEDURE — G0439 PPPS, SUBSEQ VISIT: HCPCS

## 2024-04-02 PROCEDURE — 3074F SYST BP LT 130 MM HG: CPT

## 2024-04-02 ASSESSMENT — ENCOUNTER SYMPTOMS: GENERAL WELL-BEING: GOOD

## 2024-04-02 ASSESSMENT — ACTIVITIES OF DAILY LIVING (ADL): BATHING_REQUIRES_ASSISTANCE: 0

## 2024-04-02 ASSESSMENT — PATIENT HEALTH QUESTIONNAIRE - PHQ9: CLINICAL INTERPRETATION OF PHQ2 SCORE: 0

## 2024-04-02 ASSESSMENT — FIBROSIS 4 INDEX: FIB4 SCORE: 1.24

## 2024-04-02 NOTE — PROGRESS NOTES
Chief Complaint   Patient presents with    Annual Wellness Visit     HPI:  Mckenzie Hitchcock is a 68 y.o. here for Medicare Annual Wellness Visit     Patient Active Problem List    Diagnosis Date Noted    Temperature intolerance 11/02/2023    Lightheaded 11/02/2023    Hepatitis B core antibody positive 05/18/2023    Obesity (BMI 30.0-34.9) 10/11/2022    Dysuria 08/24/2022    Abnormal CBC 06/24/2021    Dysphagia 06/24/2021    Dry throat 12/30/2020    Dyslipidemia 12/30/2020    Vitamin D deficiency 12/30/2020    Postmenopausal 03/15/2020    Elevated glucose 04/16/2019    Unspecified glaucoma 02/07/2006     Current Outpatient Medications   Medication Sig Dispense Refill    Coenzyme Q10 (CO Q 10 PO) Take  by mouth.      Calcium Carb-Cholecalciferol (CALCIUM + VITAMIN D3 PO) Take  by mouth.       No current facility-administered medications for this visit.          Current supplements as per medication list.     Allergies: Patient has no known allergies.    Current social contact/activities: Walks everyday when its nice outside      She  reports that she has never smoked. She has never used smokeless tobacco. She reports that she does not drink alcohol and does not use drugs.  Counseling given: Not Answered    ROS:    Gait: Uses no assistive device  Ostomy: No  Other tubes: No  Amputations: No  Chronic oxygen use: No  Last eye exam: 03/2024  Wears hearing aids: No   : Reports urinary leakage during the last 6 months that has somewhat interfered with their daily activities or sleep.    Screening:    Depression Screening  Little interest or pleasure in doing things?  0 - not at all  Feeling down, depressed , or hopeless? 0 - not at all  Patient Health Questionnaire Score: 0     If depressive symptoms identified deferred to follow up visit unless specifically addressed in assessment and plan.    Interpretation of PHQ-9 Total Score   Score Severity   1-4 No Depression   5-9 Mild Depression   10-14 Moderate Depression    15-19 Moderately Severe Depression   20-27 Severe Depression    Screening for Cognitive Impairment  Do you or any of your friends or family members have any concern about your memory? No  Three Minute Recall (Leader, Season, Table) 3/3    Ishan clock face with all 12 numbers and set the hands to show 10 minutes after 11.  Yes    Cognitive concerns identified deferred for follow up unless specifically addressed in assessment and plan.    Fall Risk Assessment  Has the patient had two or more falls in the last year or any fall with injury in the last year?  No    Safety Assessment  Do you always wear your seatbelt?  Yes  Any changes to home needed to function safely? No  Difficulty hearing.  No  Patient counseled about all safety risks that were identified.    Functional Assessment ADLs  Are there any barriers preventing you from cooking for yourself or meeting nutritional needs?  No.    Are there any barriers preventing you from driving safely or obtaining transportation?  No.    Are there any barriers preventing you from using a telephone or calling for help?  No    Are there any barriers preventing you from shopping?  No.    Are there any barriers preventing you from taking care of your own finances?  No    Are there any barriers preventing you from managing your medications?  No    Are there any barriers preventing you from showering, bathing or dressing yourself? No    Are there any barriers preventing you from doing housework or laundry? No  Are there any barriers preventing you from using the toilet?No  Are you currently engaging in any exercise or physical activity?  Yes. Walks everyday when its nice outside     Self-Assessment of Health  What is your perception of your health? Good  Do you sleep more than six hours a night? Yes  In the past 7 days, how much did pain keep you from doing your normal work? None  Do you spend quality time with family or friends (virtually or in person)? Yes  Do you usually eat a  heart healthy diet that constists of a variety of fruits, vegetables, whole grains and fiber? Yes  Do you eat foods high in fat and/or Fast Food more than three times per week? No    Advance Care Planning  Do you have an Advance Directive, Living Will, Durable Power of , or POLST? Yes  Advance Directive   Durable Power of    is on file      Health Maintenance Summary            Mammogram (Every 2 Years) Next due on 8/24/2024 08/24/2022  MA-SCREENING MAMMO BILAT W/TOMOSYNTHESIS W/CAD    08/04/2021  MA-SCREENING MAMMO BILAT W/TOMOSYNTHESIS W/CAD    06/23/2020  MA-SCREENING MAMMO BILAT W/TOMOSYNTHESIS W/CAD    02/01/2019  MA-SCREENING MAMMO BILAT W/TOMOSYNTHESIS W/CAD    01/30/2018  MA-SCREEN MAMMO W/CAD-BILAT    Only the first 5 history entries have been loaded, but more history exists.              Annual Wellness Visit (Yearly) Next due on 4/2/2025 04/02/2024  Visit Dx: Encounter for Medicare annual wellness exam    04/02/2024  Level of Service: ANNUAL WELLNESS VISIT-INCLUDES PPPS SUBSEQUE*    10/11/2022  Level of Service: HI ANNUAL WELLNESS VISIT-INCLUDES PPPS SUBSEQUE*    10/11/2022  Visit Dx: Medicare annual wellness visit, subsequent    10/04/2021  Level of Service: HI ANNUAL WELLNESS VISIT-INCLUDES PPPS SUBSEQUE*    Only the first 5 history entries have been loaded, but more history exists.              Bone Density Scan (Every 5 Years) Next due on 6/23/2025 06/23/2020  DS-BONE DENSITY STUDY (DEXA)              Colorectal Cancer Screening (Colon Cancer Screening Cologuard Stool (FIT DNA) - Every 3 Years) Tentatively due on 6/1/2026 06/01/2023  COLOGUARD COLON CANCER SCREENING    06/01/2023  COLOGUARD COLON CANCER SCREENING    03/25/2020  COLOGUARD COLON CANCER SCREENING    03/25/2020  COLOGUARD COLON CANCER SCREENING    07/01/2019  OCCULT BLOOD FECES IMMUNOASSAY (FIT)    Only the first 5 history entries have been loaded, but more history exists.              IMM DTaP/Tdap/Td  Vaccine (3 - Td or Tdap) Next due on 1/25/2028 01/25/2018  Imm Admin: Tdap Vaccine    09/15/2009  Imm Admin: Tdap Vaccine              Hepatitis C Screening  Tentatively Complete      08/06/2021  Hepatitis C Antibody component of HEPATITIS PANEL ACUTE(4 COMPONENTS)    03/18/2020  Hepatitis C Antibody component of HEP C VIRUS ANTIBODY              Pneumococcal Vaccine: 65+ Years (Series Information) Completed      08/06/2021  Imm Admin: Pneumococcal polysaccharide vaccine (PPSV-23)    04/16/2019  Imm Admin: Pneumococcal Conjugate Vaccine (Prevnar/PCV-13)              Zoster (Shingles) Vaccines (Series Information) Completed      10/29/2021  Imm Admin: Zoster Vaccine Recombinant (RZV) (SHINGRIX)    09/02/2021  Imm Admin: Zoster Vaccine Recombinant (RZV) (SHINGRIX)    09/01/2016  Imm Admin: Zoster Vaccine Live (ZVL) (Zostavax) - HISTORICAL DATA              Influenza Vaccine (Series Information) Completed      11/02/2023  Imm Admin: Influenza Vaccine Adult HD    10/11/2022  Imm Admin: Influenza Vaccine Adult HD    10/04/2021  Imm Admin: Influenza Vaccine Adult HD    10/12/2020  Imm Admin: Influenza Vaccine Adult HD    10/23/2019  Imm Admin: Influenza Vaccine Adult HD    Only the first 5 history entries have been loaded, but more history exists.              COVID-19 Vaccine (Series Information) Completed      12/28/2023  Imm Admin: Covid-19 Mrna (Spikevax) Moderna 12+ Years    11/22/2022  Imm Admin: PFIZER BIVALENT SARS-COV-2 VACCINE (12+)    05/19/2022  Imm Admin: PFIZER HAYWARD CAP SARS-COV-2 VACCINATION (12+)    11/29/2021  Imm Admin: PFIZER PURPLE CAP SARS-COV-2 VACCINATION (12+)    04/02/2021  Imm Admin: PFIZER PURPLE CAP SARS-COV-2 VACCINATION (12+)    Only the first 5 history entries have been loaded, but more history exists.              Hepatitis A Vaccine (Hep A) (Series Information) Aged Out      No completion history exists for this topic.              HPV Vaccines (Series Information) Aged Out      No  "completion history exists for this topic.              Polio Vaccine (Inactivated Polio) (Series Information) Aged Out      No completion history exists for this topic.              Meningococcal Immunization (Series Information) Aged Out      No completion history exists for this topic.              Discontinued - Hepatitis B Vaccine (Hep B)  Discontinued      No completion history exists for this topic.                  Patient Care Team:  PIERCE Angulo as PCP - General (Nurse Practitioner Family)  Gentry Farley M.D. as Consulting Physician (Ophthalmology)    Social History     Tobacco Use    Smoking status: Never    Smokeless tobacco: Never   Vaping Use    Vaping Use: Never used   Substance Use Topics    Alcohol use: No    Drug use: No     Family History   Problem Relation Age of Onset    Heart Attack Mother 76    Kidney Disease Mother     Lung Cancer Father 56    Alcohol/Drug Father         Alcoholic    Other Sister         Low BP    Other Brother         Back and joint issues    Lung Disease Maternal Grandmother     No Known Problems Maternal Grandfather     No Known Problems Paternal Grandmother     Other Paternal Grandfather         Suicide    Alcohol/Drug Paternal Grandfather     Hypertension Sister     No Known Problems Son     No Known Problems Son      She  has a past medical history of BMI 31.0-31.9,adult (1/25/2018), Encounter for screening mammogram for breast cancer (3/15/2020), Need for vaccination (1/25/2018), Screening for colon cancer (3/15/2020), and Screening for colorectal cancer (4/16/2019).   Past Surgical History:   Procedure Laterality Date    CATARACT EXTRACTION WITH IOL Bilateral 11/2023    ORIF, WRIST Left 03/2017    FOOT SURGERY Right 80's    TONSILLECTOMY  60's     Exam:   /72   Pulse 76   Temp 36.1 °C (97 °F) (Temporal)   Resp 20   Ht 1.778 m (5' 10\")   Wt 96.6 kg (213 lb)   SpO2 98%  Body mass index is 30.56 kg/m².    Hearing good.    Dentition good  Alert, " oriented in no acute distress.  Eye contact is good, speech goal directed, affect calm    Assessment and Plan. The following treatment and monitoring plan is recommended:    1. Encounter for Medicare annual wellness exam    Services suggested: No services needed at this time  Health Care Screening: Age-appropriate preventive services recommended by USPTF and ACIP covered by Medicare were discussed today. Services ordered if indicated and agreed upon by the patient.  Referrals offered: Community-based lifestyle interventions to reduce health risks and promote self-management and wellness, fall prevention, nutrition, physical activity, tobacco-use cessation, weight loss, and mental health services as per orders if indicated.    Discussion today about general wellness and lifestyle habits:    Prevent falls and reduce trip hazards; Cautioned about securing or removing rugs.  Have a working fire alarm and carbon monoxide detector;   Engage in regular physical activity and social activities     Follow-up: Return in about 3 days (around 4/5/2024) for Dizziness.

## 2024-04-05 ENCOUNTER — OFFICE VISIT (OUTPATIENT)
Dept: MEDICAL GROUP | Facility: PHYSICIAN GROUP | Age: 69
End: 2024-04-05
Payer: MEDICARE

## 2024-04-05 VITALS
WEIGHT: 213 LBS | TEMPERATURE: 97.9 F | HEART RATE: 76 BPM | DIASTOLIC BLOOD PRESSURE: 80 MMHG | OXYGEN SATURATION: 98 % | SYSTOLIC BLOOD PRESSURE: 136 MMHG | BODY MASS INDEX: 30.49 KG/M2 | RESPIRATION RATE: 20 BRPM | HEIGHT: 70 IN

## 2024-04-05 DIAGNOSIS — Z12.31 ENCOUNTER FOR SCREENING MAMMOGRAM FOR MALIGNANT NEOPLASM OF BREAST: ICD-10-CM

## 2024-04-05 DIAGNOSIS — R42 LIGHTHEADED: ICD-10-CM

## 2024-04-05 DIAGNOSIS — L98.9 SKIN LESION OF FACE: ICD-10-CM

## 2024-04-05 DIAGNOSIS — E83.110 HEREDITARY HEMOCHROMATOSIS (HCC): ICD-10-CM

## 2024-04-05 PROCEDURE — 3075F SYST BP GE 130 - 139MM HG: CPT

## 2024-04-05 PROCEDURE — 99213 OFFICE O/P EST LOW 20 MIN: CPT

## 2024-04-05 PROCEDURE — 3079F DIAST BP 80-89 MM HG: CPT

## 2024-04-05 ASSESSMENT — FIBROSIS 4 INDEX: FIB4 SCORE: 1.24

## 2024-04-05 ASSESSMENT — ENCOUNTER SYMPTOMS
DIARRHEA: 0
NAUSEA: 0
PALPITATIONS: 0
DIZZINESS: 0
BLOOD IN STOOL: 0
WHEEZING: 0
SHORTNESS OF BREATH: 0
TINGLING: 0
CONSTIPATION: 0
HEADACHES: 0
CHILLS: 0
COUGH: 0
ABDOMINAL PAIN: 0
FEVER: 0
VOMITING: 0
WEIGHT LOSS: 0

## 2024-04-05 NOTE — PROGRESS NOTES
Subjective:     Chief Complaint   Patient presents with    Bump     On OS lower lid    Sweats     Face starts to sweat if she stands to long      HPI: Mckenzie presents today with  Problem   Skin Lesion of Face    Patient complains of left cheek skin lesion for the last 2 years.  Patient states that it will come and go.  Recently it was dry and had a point to it and then noticed yesterday it fell off.  However, the bump within her cheek is still there.  She notes that when this does occur she typically tries to apply more moisturizer to it to soften it which does help some.  Patient does have other lumps or wartlike lesions to her face.  As well as lentigo spots and acrochordons to her trunk.  Patient has not seen a dermatologist.     Lightheaded    Patient presents today complaining of this lightheaded and/or flushing more often as of late.  Patient does note that symptoms occur only while in kitchen with standing. Patient has switched alternating with small tasks and sitting to prevent this lightheaded/sweating/flushing from occurring while in the kitchen.  Patient was in earlier this week for her Medicare annual wellness and we briefly discussed this and I mentioned to her to be aware of how her legs are positioned in the kitchen.  Patient states that since our visit she had another episode and noticed her legs are hyperextended and tried to bend unfortunately she was still having symptoms and had to sit down still.    Interval History:  11/2/2023- Patient reports over the summer especially with the changes in heat or after she donates blood noticing she gets lightheaded.  It comes and goes and with rest goes away.       Hereditary Hemochromatosis (Hcc)    In 2021, patient was found to be heterozygous for hereditary hemochromatosis with a, elevated ferritin at 451.0.   Patient currently donates blood every 6 weeks.  Blood work earlier this year (1/2024) showed anemia and iron deficiency.  Patient states that when  "she goes to donate blood they do check her iron levels and on her last blood donation her iron was within normal limits in February.     Allergies: Patient has no known allergies.    Review of Systems   Constitutional:  Negative for chills, fever and weight loss.   Respiratory:  Negative for cough, shortness of breath and wheezing.    Cardiovascular:  Negative for chest pain, palpitations and leg swelling.   Gastrointestinal:  Negative for abdominal pain, blood in stool, constipation, diarrhea, nausea and vomiting.   Genitourinary:  Negative for hematuria.   Skin:  Negative for itching and rash.   Neurological:  Negative for dizziness, tingling and headaches.     Health Maintenance: Deferred at this time   Objective:     /80   Pulse 76   Temp 36.6 °C (97.9 °F) (Temporal)   Resp 20   Ht 1.778 m (5' 10\")   Wt 96.6 kg (213 lb)   SpO2 98%   Breastfeeding No   BMI 30.56 kg/m²  Body mass index is 30.56 kg/m².     Physical Exam  Vitals reviewed.   Constitutional:       General: She is not in acute distress.     Appearance: Normal appearance. She is not ill-appearing.   HENT:      Head:        Comments: 2 cm pink symmetrical lesion below the left eye on her left cheek  Smooth to the surface but a small nontender lump palpated superficially to this location  Wartlike lesions or actinic keratoses like lesions throughout her face  Cardiovascular:      Rate and Rhythm: Normal rate and regular rhythm.   Pulmonary:      Effort: Pulmonary effort is normal. No respiratory distress.   Skin:     Coloration: Skin is not jaundiced or pale.   Neurological:      General: No focal deficit present.      Mental Status: She is alert and oriented to person, place, and time.   Psychiatric:         Mood and Affect: Mood normal.         Behavior: Behavior normal.         Thought Content: Thought content normal.         Judgment: Judgment normal.        Assessment and Plan:     The following treatment plan was discussed through " shared decision making with the patient:    1. Skin lesion of face  Chronic, uncontrolled.  Due to location and length of time this has been present we will refer her to dermatology to have this further looked at.  Encourage patient to have them also look at the rest of her skin lesions as she does have a significant amount throughout her face and her body.  Reviewed different types of skin lesions and the ones to be concerned about especially if it is asymmetrical and grows significantly over a short period of time.  - Referral to Dermatology    2. Hereditary hemochromatosis (HCC)  3. Lightheaded  Chronic, uncontrolled.  Likely her symptoms are associated with her hemochromatosis and or also walking out or hyperextending her knees causing this sensation.  Patient does note that when she was a child she would get similar sensations when she was kneeling on the [place] at [employer].  Encourage patient to be vigilant about when this is occurring and to be cognizant about her knee placement while standing in the kitchen.  Encourage patient to follow-up sooner if she notices the symptoms occur outside of these times that she previously mentioned.  Also recommended to patient to prolong her donation of blood times from 6 weeks to 8 weeks to see if this helps improve her symptoms.  We will follow-up as needed if this continues to persist and consider cardiology workup.    4. Encounter for screening mammogram for malignant neoplasm of breast  Due for updated mammogram.  - MA-SCREENING MAMMO BILAT W/TOMOSYNTHESIS W/CAD; Future    Return in about 3 months (around 7/5/2024).         Please note that this note was created using dictation with voice recognition software. I have made every reasonable attempt to correct obvious errors, but I expect that there are errors of grammar and possibly content that I did not discover before finalizing the note.    CLAUDIA Angulo  [facility]  [facility]

## 2024-04-22 ENCOUNTER — HOSPITAL ENCOUNTER (OUTPATIENT)
Dept: RADIOLOGY | Facility: MEDICAL CENTER | Age: 69
End: 2024-04-22
Payer: MEDICARE

## 2024-04-22 DIAGNOSIS — Z12.31 ENCOUNTER FOR SCREENING MAMMOGRAM FOR MALIGNANT NEOPLASM OF BREAST: ICD-10-CM

## 2024-04-22 PROCEDURE — 77067 SCR MAMMO BI INCL CAD: CPT

## 2024-05-29 ENCOUNTER — APPOINTMENT (RX ONLY)
Dept: URBAN - METROPOLITAN AREA CLINIC 22 | Facility: CLINIC | Age: 69
Setting detail: DERMATOLOGY
End: 2024-05-29

## 2024-05-29 DIAGNOSIS — L57.0 ACTINIC KERATOSIS: ICD-10-CM

## 2024-05-29 DIAGNOSIS — L91.8 OTHER HYPERTROPHIC DISORDERS OF THE SKIN: ICD-10-CM

## 2024-05-29 DIAGNOSIS — L82.1 OTHER SEBORRHEIC KERATOSIS: ICD-10-CM

## 2024-05-29 PROBLEM — D48.5 NEOPLASM OF UNCERTAIN BEHAVIOR OF SKIN: Status: ACTIVE | Noted: 2024-05-29

## 2024-05-29 PROCEDURE — 99203 OFFICE O/P NEW LOW 30 MIN: CPT | Mod: 25

## 2024-05-29 PROCEDURE — 17000 DESTRUCT PREMALG LESION: CPT

## 2024-05-29 PROCEDURE — ? COUNSELING

## 2024-05-29 PROCEDURE — ? LIQUID NITROGEN

## 2024-05-29 ASSESSMENT — LOCATION ZONE DERM
LOCATION ZONE: FACE
LOCATION ZONE: TRUNK

## 2024-05-29 ASSESSMENT — LOCATION SIMPLE DESCRIPTION DERM
LOCATION SIMPLE: ABDOMEN
LOCATION SIMPLE: GROIN
LOCATION SIMPLE: LEFT CHEEK
LOCATION SIMPLE: LEFT BREAST

## 2024-05-29 ASSESSMENT — LOCATION DETAILED DESCRIPTION DERM
LOCATION DETAILED: RIGHT SUPRAPUBIC SKIN
LOCATION DETAILED: SUBXIPHOID
LOCATION DETAILED: LEFT CENTRAL MALAR CHEEK
LOCATION DETAILED: LEFT INFRAMAMMARY CREASE (INNER QUADRANT)

## 2024-10-02 ENCOUNTER — NON-PROVIDER VISIT (OUTPATIENT)
Dept: MEDICAL GROUP | Facility: PHYSICIAN GROUP | Age: 69
End: 2024-10-02
Payer: MEDICARE

## 2024-10-02 DIAGNOSIS — Z23 NEED FOR VACCINATION: ICD-10-CM

## 2024-10-02 PROCEDURE — G0008 ADMIN INFLUENZA VIRUS VAC: HCPCS

## 2024-10-02 PROCEDURE — 90662 IIV NO PRSV INCREASED AG IM: CPT

## 2025-08-07 ENCOUNTER — OFFICE VISIT (OUTPATIENT)
Dept: MEDICAL GROUP | Facility: PHYSICIAN GROUP | Age: 70
End: 2025-08-07
Payer: MEDICARE

## 2025-08-07 VITALS
SYSTOLIC BLOOD PRESSURE: 130 MMHG | TEMPERATURE: 97.3 F | BODY MASS INDEX: 30.06 KG/M2 | RESPIRATION RATE: 18 BRPM | OXYGEN SATURATION: 98 % | HEIGHT: 70 IN | WEIGHT: 210 LBS | DIASTOLIC BLOOD PRESSURE: 80 MMHG | HEART RATE: 70 BPM

## 2025-08-07 DIAGNOSIS — E78.5 DYSLIPIDEMIA: ICD-10-CM

## 2025-08-07 DIAGNOSIS — Z13.0 SCREENING FOR ENDOCRINE, METABOLIC AND IMMUNITY DISORDER: ICD-10-CM

## 2025-08-07 DIAGNOSIS — E55.9 VITAMIN D DEFICIENCY: ICD-10-CM

## 2025-08-07 DIAGNOSIS — E83.110 HEREDITARY HEMOCHROMATOSIS (HCC): ICD-10-CM

## 2025-08-07 DIAGNOSIS — R00.2 PALPITATIONS: ICD-10-CM

## 2025-08-07 DIAGNOSIS — M54.50 ACUTE MIDLINE LOW BACK PAIN WITHOUT SCIATICA: ICD-10-CM

## 2025-08-07 DIAGNOSIS — M25.571 ACUTE RIGHT ANKLE PAIN: ICD-10-CM

## 2025-08-07 DIAGNOSIS — R73.09 ELEVATED GLUCOSE: ICD-10-CM

## 2025-08-07 DIAGNOSIS — Z13.228 SCREENING FOR ENDOCRINE, METABOLIC AND IMMUNITY DISORDER: ICD-10-CM

## 2025-08-07 DIAGNOSIS — R42 DIZZINESS: ICD-10-CM

## 2025-08-07 DIAGNOSIS — R42 LIGHTHEADED: Primary | ICD-10-CM

## 2025-08-07 DIAGNOSIS — Z13.29 SCREENING FOR ENDOCRINE, METABOLIC AND IMMUNITY DISORDER: ICD-10-CM

## 2025-08-07 PROCEDURE — 3079F DIAST BP 80-89 MM HG: CPT

## 2025-08-07 PROCEDURE — 3075F SYST BP GE 130 - 139MM HG: CPT

## 2025-08-07 PROCEDURE — 99214 OFFICE O/P EST MOD 30 MIN: CPT

## 2025-08-07 PROCEDURE — 93000 ELECTROCARDIOGRAM COMPLETE: CPT

## 2025-08-07 ASSESSMENT — PATIENT HEALTH QUESTIONNAIRE - PHQ9: CLINICAL INTERPRETATION OF PHQ2 SCORE: 0

## 2025-08-07 ASSESSMENT — FIBROSIS 4 INDEX: FIB4 SCORE: 1.26

## 2025-08-13 ENCOUNTER — HOSPITAL ENCOUNTER (OUTPATIENT)
Dept: LAB | Facility: MEDICAL CENTER | Age: 70
End: 2025-08-13
Payer: MEDICARE

## 2025-08-13 DIAGNOSIS — Z13.29 SCREENING FOR ENDOCRINE, METABOLIC AND IMMUNITY DISORDER: ICD-10-CM

## 2025-08-13 DIAGNOSIS — Z13.228 SCREENING FOR ENDOCRINE, METABOLIC AND IMMUNITY DISORDER: ICD-10-CM

## 2025-08-13 DIAGNOSIS — E55.9 VITAMIN D DEFICIENCY: ICD-10-CM

## 2025-08-13 DIAGNOSIS — E78.5 DYSLIPIDEMIA: ICD-10-CM

## 2025-08-13 DIAGNOSIS — Z13.0 SCREENING FOR ENDOCRINE, METABOLIC AND IMMUNITY DISORDER: ICD-10-CM

## 2025-08-13 DIAGNOSIS — E83.110 HEREDITARY HEMOCHROMATOSIS (HCC): ICD-10-CM

## 2025-08-13 DIAGNOSIS — R73.09 ELEVATED GLUCOSE: ICD-10-CM

## 2025-08-13 LAB
25(OH)D3 SERPL-MCNC: 64 NG/ML (ref 30–100)
ALBUMIN SERPL BCP-MCNC: 4.3 G/DL (ref 3.2–4.9)
ALBUMIN/GLOB SERPL: 1.4 G/DL
ALP SERPL-CCNC: 92 U/L (ref 30–99)
ALT SERPL-CCNC: 10 U/L (ref 2–50)
ANION GAP SERPL CALC-SCNC: 12 MMOL/L (ref 7–16)
ANISOCYTOSIS BLD QL SMEAR: ABNORMAL
AST SERPL-CCNC: 17 U/L (ref 12–45)
BASOPHILS # BLD AUTO: 1.5 % (ref 0–1.8)
BASOPHILS # BLD: 0.07 K/UL (ref 0–0.12)
BILIRUB SERPL-MCNC: 0.5 MG/DL (ref 0.1–1.5)
BUN SERPL-MCNC: 14 MG/DL (ref 8–22)
CALCIUM ALBUM COR SERPL-MCNC: 8.9 MG/DL (ref 8.5–10.5)
CALCIUM SERPL-MCNC: 9.1 MG/DL (ref 8.5–10.5)
CHLORIDE SERPL-SCNC: 107 MMOL/L (ref 96–112)
CHOLEST SERPL-MCNC: 181 MG/DL (ref 100–199)
CO2 SERPL-SCNC: 22 MMOL/L (ref 20–33)
COMMENT 1642: NORMAL
CREAT SERPL-MCNC: 0.82 MG/DL (ref 0.5–1.4)
EOSINOPHIL # BLD AUTO: 0.18 K/UL (ref 0–0.51)
EOSINOPHIL NFR BLD: 3.9 % (ref 0–6.9)
ERYTHROCYTE [DISTWIDTH] IN BLOOD BY AUTOMATED COUNT: 52.3 FL (ref 35.9–50)
EST. AVERAGE GLUCOSE BLD GHB EST-MCNC: 131 MG/DL
GFR SERPLBLD CREATININE-BSD FMLA CKD-EPI: 76 ML/MIN/1.73 M 2
GLOBULIN SER CALC-MCNC: 3 G/DL (ref 1.9–3.5)
GLUCOSE SERPL-MCNC: 104 MG/DL (ref 65–99)
HBA1C MFR BLD: 6.2 % (ref 4–5.6)
HCT VFR BLD AUTO: 40 % (ref 37–47)
HDLC SERPL-MCNC: 65 MG/DL
HGB BLD-MCNC: 11.8 G/DL (ref 12–16)
HYPOCHROMIA BLD QL SMEAR: ABNORMAL
IMM GRANULOCYTES # BLD AUTO: 0.01 K/UL (ref 0–0.11)
IMM GRANULOCYTES NFR BLD AUTO: 0.2 % (ref 0–0.9)
LDLC SERPL CALC-MCNC: 99 MG/DL
LYMPHOCYTES # BLD AUTO: 1.5 K/UL (ref 1–4.8)
LYMPHOCYTES NFR BLD: 32.1 % (ref 22–41)
MCH RBC QN AUTO: 22.4 PG (ref 27–33)
MCHC RBC AUTO-ENTMCNC: 29.5 G/DL (ref 32.2–35.5)
MCV RBC AUTO: 75.9 FL (ref 81.4–97.8)
MICROCYTES BLD QL SMEAR: ABNORMAL
MONOCYTES # BLD AUTO: 0.34 K/UL (ref 0–0.85)
MONOCYTES NFR BLD AUTO: 7.3 % (ref 0–13.4)
MORPHOLOGY BLD-IMP: NORMAL
NEUTROPHILS # BLD AUTO: 2.57 K/UL (ref 1.82–7.42)
NEUTROPHILS NFR BLD: 55 % (ref 44–72)
NRBC # BLD AUTO: 0 K/UL
NRBC BLD-RTO: 0 /100 WBC (ref 0–0.2)
PLATELET # BLD AUTO: 367 K/UL (ref 164–446)
PLATELET BLD QL SMEAR: NORMAL
PMV BLD AUTO: 9.7 FL (ref 9–12.9)
POTASSIUM SERPL-SCNC: 4.6 MMOL/L (ref 3.6–5.5)
PROT SERPL-MCNC: 7.3 G/DL (ref 6–8.2)
RBC # BLD AUTO: 5.27 M/UL (ref 4.2–5.4)
RBC BLD AUTO: PRESENT
SODIUM SERPL-SCNC: 141 MMOL/L (ref 135–145)
TRIGL SERPL-MCNC: 84 MG/DL (ref 0–149)
TSH SERPL DL<=0.005 MIU/L-ACNC: 2.02 UIU/ML (ref 0.38–5.33)
WBC # BLD AUTO: 4.7 K/UL (ref 4.8–10.8)

## 2025-08-13 PROCEDURE — 80053 COMPREHEN METABOLIC PANEL: CPT

## 2025-08-13 PROCEDURE — 36415 COLL VENOUS BLD VENIPUNCTURE: CPT

## 2025-08-13 PROCEDURE — 83036 HEMOGLOBIN GLYCOSYLATED A1C: CPT | Mod: GA

## 2025-08-13 PROCEDURE — 80061 LIPID PANEL: CPT

## 2025-08-13 PROCEDURE — 82306 VITAMIN D 25 HYDROXY: CPT

## 2025-08-13 PROCEDURE — 84443 ASSAY THYROID STIM HORMONE: CPT

## 2025-08-13 PROCEDURE — 85025 COMPLETE CBC W/AUTO DIFF WBC: CPT
